# Patient Record
Sex: MALE | Race: ASIAN | NOT HISPANIC OR LATINO | Employment: UNEMPLOYED | URBAN - METROPOLITAN AREA
[De-identification: names, ages, dates, MRNs, and addresses within clinical notes are randomized per-mention and may not be internally consistent; named-entity substitution may affect disease eponyms.]

---

## 2022-08-14 ENCOUNTER — APPOINTMENT (OUTPATIENT)
Dept: RADIOLOGY | Facility: HOSPITAL | Age: 37
End: 2022-08-14
Attending: STUDENT IN AN ORGANIZED HEALTH CARE EDUCATION/TRAINING PROGRAM
Payer: COMMERCIAL

## 2022-08-14 ENCOUNTER — HOSPITAL ENCOUNTER (EMERGENCY)
Facility: HOSPITAL | Age: 37
Discharge: HOME | End: 2022-08-14
Attending: EMERGENCY MEDICINE | Admitting: EMERGENCY MEDICINE
Payer: COMMERCIAL

## 2022-08-14 VITALS
HEART RATE: 94 BPM | WEIGHT: 130 LBS | RESPIRATION RATE: 31 BRPM | OXYGEN SATURATION: 94 % | TEMPERATURE: 98.2 F | DIASTOLIC BLOOD PRESSURE: 88 MMHG | SYSTOLIC BLOOD PRESSURE: 138 MMHG | HEIGHT: 63 IN | BODY MASS INDEX: 23.04 KG/M2

## 2022-08-14 DIAGNOSIS — J45.909 PERSISTENT ASTHMA WITHOUT COMPLICATION, UNSPECIFIED ASTHMA SEVERITY: ICD-10-CM

## 2022-08-14 LAB
ANION GAP SERPL CALCULATED.3IONS-SCNC: 8 MMOL/L (ref 5–18)
ATRIAL RATE - MUSE: 71 BPM
BASOPHILS # BLD AUTO: 0.1 10E3/UL (ref 0–0.2)
BASOPHILS NFR BLD AUTO: 1 %
BUN SERPL-MCNC: 22 MG/DL (ref 8–22)
CALCIUM SERPL-MCNC: 9.6 MG/DL (ref 8.5–10.5)
CHLORIDE BLD-SCNC: 102 MMOL/L (ref 98–107)
CO2 SERPL-SCNC: 30 MMOL/L (ref 22–31)
CREAT SERPL-MCNC: 1.14 MG/DL (ref 0.7–1.3)
DIASTOLIC BLOOD PRESSURE - MUSE: NORMAL MMHG
EOSINOPHIL # BLD AUTO: 1.1 10E3/UL (ref 0–0.7)
EOSINOPHIL NFR BLD AUTO: 10 %
ERYTHROCYTE [DISTWIDTH] IN BLOOD BY AUTOMATED COUNT: 13.1 % (ref 10–15)
FLUAV RNA SPEC QL NAA+PROBE: NEGATIVE
FLUBV RNA RESP QL NAA+PROBE: NEGATIVE
GFR SERPL CREATININE-BSD FRML MDRD: 85 ML/MIN/1.73M2
GLUCOSE BLD-MCNC: 95 MG/DL (ref 70–125)
HCT VFR BLD AUTO: 52.4 % (ref 40–53)
HGB BLD-MCNC: 17.2 G/DL (ref 13.3–17.7)
IMM GRANULOCYTES # BLD: 0 10E3/UL
IMM GRANULOCYTES NFR BLD: 0 %
INTERPRETATION ECG - MUSE: NORMAL
LYMPHOCYTES # BLD AUTO: 3.4 10E3/UL (ref 0.8–5.3)
LYMPHOCYTES NFR BLD AUTO: 32 %
MCH RBC QN AUTO: 30.1 PG (ref 26.5–33)
MCHC RBC AUTO-ENTMCNC: 32.8 G/DL (ref 31.5–36.5)
MCV RBC AUTO: 92 FL (ref 78–100)
MONOCYTES # BLD AUTO: 0.6 10E3/UL (ref 0–1.3)
MONOCYTES NFR BLD AUTO: 6 %
NEUTROPHILS # BLD AUTO: 5.4 10E3/UL (ref 1.6–8.3)
NEUTROPHILS NFR BLD AUTO: 51 %
NRBC # BLD AUTO: 0 10E3/UL
NRBC BLD AUTO-RTO: 0 /100
P AXIS - MUSE: 76 DEGREES
PLATELET # BLD AUTO: 201 10E3/UL (ref 150–450)
POTASSIUM BLD-SCNC: 4.8 MMOL/L (ref 3.5–5)
PR INTERVAL - MUSE: 178 MS
QRS DURATION - MUSE: 92 MS
QT - MUSE: 374 MS
QTC - MUSE: 406 MS
R AXIS - MUSE: 95 DEGREES
RBC # BLD AUTO: 5.71 10E6/UL (ref 4.4–5.9)
RSV RNA SPEC NAA+PROBE: NEGATIVE
SARS-COV-2 RNA RESP QL NAA+PROBE: NEGATIVE
SODIUM SERPL-SCNC: 140 MMOL/L (ref 136–145)
SYSTOLIC BLOOD PRESSURE - MUSE: NORMAL MMHG
T AXIS - MUSE: 76 DEGREES
TROPONIN I SERPL-MCNC: <0.01 NG/ML (ref 0–0.29)
VENTRICULAR RATE- MUSE: 71 BPM
WBC # BLD AUTO: 10.5 10E3/UL (ref 4–11)

## 2022-08-14 PROCEDURE — C9803 HOPD COVID-19 SPEC COLLECT: HCPCS

## 2022-08-14 PROCEDURE — 93005 ELECTROCARDIOGRAM TRACING: CPT | Performed by: EMERGENCY MEDICINE

## 2022-08-14 PROCEDURE — 36415 COLL VENOUS BLD VENIPUNCTURE: CPT | Performed by: STUDENT IN AN ORGANIZED HEALTH CARE EDUCATION/TRAINING PROGRAM

## 2022-08-14 PROCEDURE — 80048 BASIC METABOLIC PNL TOTAL CA: CPT | Performed by: EMERGENCY MEDICINE

## 2022-08-14 PROCEDURE — 99285 EMERGENCY DEPT VISIT HI MDM: CPT | Mod: 25

## 2022-08-14 PROCEDURE — 250N000009 HC RX 250: Performed by: EMERGENCY MEDICINE

## 2022-08-14 PROCEDURE — 87637 SARSCOV2&INF A&B&RSV AMP PRB: CPT | Performed by: EMERGENCY MEDICINE

## 2022-08-14 PROCEDURE — 85025 COMPLETE CBC W/AUTO DIFF WBC: CPT | Performed by: EMERGENCY MEDICINE

## 2022-08-14 PROCEDURE — 71046 X-RAY EXAM CHEST 2 VIEWS: CPT

## 2022-08-14 PROCEDURE — 250N000012 HC RX MED GY IP 250 OP 636 PS 637: Performed by: EMERGENCY MEDICINE

## 2022-08-14 PROCEDURE — 94640 AIRWAY INHALATION TREATMENT: CPT | Mod: 76

## 2022-08-14 PROCEDURE — 84484 ASSAY OF TROPONIN QUANT: CPT | Performed by: STUDENT IN AN ORGANIZED HEALTH CARE EDUCATION/TRAINING PROGRAM

## 2022-08-14 RX ORDER — PREDNISONE 20 MG/1
TABLET ORAL
Qty: 8 TABLET | Refills: 0 | Status: SHIPPED | OUTPATIENT
Start: 2022-08-14 | End: 2023-05-07

## 2022-08-14 RX ORDER — IPRATROPIUM BROMIDE AND ALBUTEROL SULFATE 2.5; .5 MG/3ML; MG/3ML
3 SOLUTION RESPIRATORY (INHALATION)
Status: COMPLETED | OUTPATIENT
Start: 2022-08-14 | End: 2022-08-14

## 2022-08-14 RX ORDER — IPRATROPIUM BROMIDE AND ALBUTEROL SULFATE 2.5; .5 MG/3ML; MG/3ML
3 SOLUTION RESPIRATORY (INHALATION) ONCE
Status: COMPLETED | OUTPATIENT
Start: 2022-08-14 | End: 2022-08-14

## 2022-08-14 RX ORDER — PREDNISONE 20 MG/1
40 TABLET ORAL ONCE
Status: COMPLETED | OUTPATIENT
Start: 2022-08-14 | End: 2022-08-14

## 2022-08-14 RX ADMIN — IPRATROPIUM BROMIDE AND ALBUTEROL SULFATE 3 ML: .5; 3 SOLUTION RESPIRATORY (INHALATION) at 20:05

## 2022-08-14 RX ADMIN — PREDNISONE 40 MG: 20 TABLET ORAL at 19:24

## 2022-08-14 RX ADMIN — IPRATROPIUM BROMIDE AND ALBUTEROL SULFATE 3 ML: .5; 3 SOLUTION RESPIRATORY (INHALATION) at 20:52

## 2022-08-14 ASSESSMENT — ACTIVITIES OF DAILY LIVING (ADL)
ADLS_ACUITY_SCORE: 33
ADLS_ACUITY_SCORE: 35

## 2022-08-14 NOTE — ED TRIAGE NOTES
Pt presents with shortness of breath that began a year ago. Denies fever, occasional cough. Was told to come in today by his father.  Pt has hx of asthma. Used language line to speak with father. Father reports that pt has been wheezing and short of breath for a while now.     Triage Assessment     Row Name 08/14/22 [id number]       Triage Assessment (Adult)    Airway WDL WDL       Respiratory WDL    Respiratory WDL rhythm/pattern    Rhythm/Pattern, Respiratory shortness of breath;dyspnea on exertion       Skin Circulation/Temperature WDL    Skin Circulation/Temperature WDL WDL       Cardiac WDL    Cardiac WDL WDL       Peripheral/Neurovascular WDL    Peripheral Neurovascular WDL WDL       Cognitive/Neuro/Behavioral WDL    Cognitive/Neuro/Behavioral WDL WDL

## 2022-08-15 NOTE — ED NOTES
Pt to ED with complaints of SOB that has been occurring for about 1 year with no improvement. Wheezes on inspiration and expiration on assessment. Language line used.

## 2022-08-15 NOTE — ED PROVIDER NOTES
Emergency Department Encounter     Evaluation Date & Time:   2022  6:50 PM    CHIEF COMPLAINT:  Shortness of Breath      Triage Note:  Pt presents with shortness of breath that began a year ago. Denies fever, occasional cough. Was told to come in today by his father.  Pt has hx of asthma. Used language line to speak with father. Father reports that pt has been wheezing and short of breath for a while now.     Triage Assessment     Row Name [id number]       Triage Assessment (Adult)    Airway WDL WDL       Respiratory WDL    Respiratory WDL rhythm/pattern    Rhythm/Pattern, Respiratory shortness of breath;dyspnea on exertion       Skin Circulation/Temperature WDL    Skin Circulation/Temperature WDL WDL       Cardiac WDL    Cardiac WDL WDL       Peripheral/Neurovascular WDL    Peripheral Neurovascular WDL WDL       Cognitive/Neuro/Behavioral WDL    Cognitive/Neuro/Behavioral WDL WDL                    Impression and Plan       FINAL IMPRESSION:    ICD-10-CM    1. Persistent asthma without complication, unspecified asthma severity  J45.909          ED COURSE & MEDICAL DECISION MAKIN year old male, history of asthma, who presents for evaluation of shortness of breath that has been persistent since his asthma diagnosis 1-2 years ago. He denies any acute worsening of his symptoms, but his parents insisted ED evaluation given that he sometimes seems to have trouble breathing and makes wheezing sounds in his sleep. He has used his MDI with some improvement.     He also reports some substernal chest pain that has been intermittent x 1-2 years. The pain is worse with cough. He does endorse a chronic, non-productive cough with no associated URI symptoms or fevers.     On exam, he has symmetrical, coarse breath sounds with scattered expiratory wheezes.    Patient was given a DuoNeb with improvement, however he continues to sound coarse with some expiratory wheezes.  He was given 40mg po prednisone and a second  DuoNeb with significant improvement in breath sounds and resolution of wheezing.    Given chest pain, EKG performed and demonstrated NSR with J-point elevation in multiple leads (similar to previous EKG) and most consistent with early repolarization with no ST-T wave changes suggestive of ACS.  Troponin WNL (<0.01); a single, normal troponin is reassuring that symptoms are not secondary to ACS given that they have been intermittent for 1-2 years and I do not think serial troponin testing is indicated.    CXR performed and was unremarkable.    Influenza, COVID and RSV tests were negative.    Labs otherwise remarkable for no leukocytosis, anemia, electrolyte derangements or renal impairment.    Patient discharged to home with follow-up with his primary care provider this week.  He was given a prescription for a prednisone burst.  Return precautions provided with assistance from the professional mPowa .  Patient stable throughout ED course.      At the conclusion of the encounter I discussed the results of all the tests and the disposition. The questions were answered. The patient acknowledged understanding and was agreeable with the care plan.      MEDICATIONS GIVEN IN THE EMERGENCY DEPARTMENT:  Medications   ipratropium - albuterol 0.5 mg/2.5 mg/3 mL (DUONEB) neb solution 3 mL (3 mLs Nebulization Given 8/14/22 2005)   predniSONE (DELTASONE) tablet 40 mg (40 mg Oral Given 8/14/22 1924)   ipratropium - albuterol 0.5 mg/2.5 mg/3 mL (DUONEB) neb solution 3 mL (3 mLs Nebulization Given 8/14/22 2052)       NEW PRESCRIPTIONS STARTED AT TODAY'S ED VISIT:  Discharge Medication List as of 8/14/2022  9:19 PM      START taking these medications    Details   predniSONE (DELTASONE) 20 MG tablet Take two tablets (= 40mg) each day for 4 (four) days, Disp-8 tablet, R-0, Local Print             HPI     HPI     Secondary to language barrier, history was obtained with assistance from the professional mPowa  via the  language line on V[employer].    Aylin Rosado is a 36 year old male, history of asthma, who presents to this ED by walk-in for evaluation of shortness of breath.    Patient reports that he was diagnosed with asthma 1-2 years ago and has had shortness of breath since. He denies any acute worsening of his symptoms, but reports that his parents were worried because when he sleeps he sometimes seems to have trouble breathing and makes wheezing sounds so they insisted he present to the ER for evaluation. He has used his MDI with some improvement.     He reports some substernal chest pain that has been intermittent since he was diagnosed with asthma. The pain does not radiate into his back, arms, neck or jaw. He is unable to describe the pain, but reports that it worsens with cough. The pain is not exertional, positional or pleuritic in nature.     He reports somewhat chronic cough with no associated URI symptoms or fevers.     He has otherwise been in his usual state of health and denies abdominal pain, N/V/D, or other concerns.    He is a daily smoker.    REVIEW OF SYSTEMS:  All other systems reviewed and are negative.      Medical History     No past medical history on file.    No past surgical history on file.    No family history on file.         predniSONE (DELTASONE) 20 MG tablet  acetaminophen (TYLENOL) 325 MG tablet  albuterol (PROAIR HFA;PROVENTIL HFA;VENTOLIN HFA) 90 mcg/actuation inhaler  cyclobenzaprine (FLEXERIL) 10 MG tablet  inhalat.spacing dev,large mask Spcr  naproxen (NAPROSYN) 500 MG tablet        Physical Exam     First Vitals:  Patient Vitals for the past 24 hrs:   BP Temp Temp src Pulse Resp SpO2 Height Weight   08/14/22 2115 138/88 -- -- 94 (!) 31 94 % -- --   08/14/22 2100 (!) 143/99 -- -- 80 25 99 % -- --   08/14/22 2045 (!) 146/104 -- -- 72 19 95 % -- --   08/14/22 2030 (!) 161/112 -- -- 78 16 95 % -- --   08/14/22 2015 (!) 148/104 -- -- 72 24 95 % -- --   08/14/22 2000 (!) 157/99 -- -- 79 26 100 % --  "--   08/14/22 1930 (!) 152/98 -- -- 73 18 100 % -- --   08/14/22 1915 (!) 152/104 -- -- 69 16 98 % -- --   08/14/22 1756 (!) 167/113 98.2  F (36.8  C) Temporal 81 22 98 % 1.6 m (5' 3\") 59 kg (130 lb)       PHYSICAL EXAM:   Physical Exam    GENERAL: Awake, alert.  In no acute distress.   HEENT: Normocephalic, atraumatic. Pupils equal, round and reactive. Conjunctiva normal.   NECK: No stridor.  PULMONARY: Symmetrical breath sounds without distress.  Breath sounds are somewhat coarse diffusely with scattered expiratory wheezes; no rales.   CARDIO: Regular rate and rhythm.  No significant murmur, rub or gallop.  Radial pulses strong and symmetrical.  ABDOMINAL: Abdomen soft, non-distended and non-tender to palpation.    EXTREMITIES: No lower extremity swelling or edema.      NEURO: Alert and oriented to person, place and time.  Cranial nerves grossly intact.  No focal motor deficit.  PSYCH: Normal mood and affect.  SKIN: No rashes.     Results     LAB:  All pertinent labs reviewed and interpreted  Labs Ordered and Resulted from Time of ED Arrival to Time of ED Departure   CBC WITH PLATELETS AND DIFFERENTIAL - Abnormal       Result Value    WBC Count 10.5      RBC Count 5.71      Hemoglobin 17.2      Hematocrit 52.4      MCV 92      MCH 30.1      MCHC 32.8      RDW 13.1      Platelet Count 201      % Neutrophils 51      % Lymphocytes 32      % Monocytes 6      % Eosinophils 10      % Basophils 1      % Immature Granulocytes 0      NRBCs per 100 WBC 0      Absolute Neutrophils 5.4      Absolute Lymphocytes 3.4      Absolute Monocytes 0.6      Absolute Eosinophils 1.1 (*)     Absolute Basophils 0.1      Absolute Immature Granulocytes 0.0      Absolute NRBCs 0.0     INFLUENZA A/B & SARS-COV2 PCR MULTIPLEX - Normal    Influenza A PCR Negative      Influenza B PCR Negative      RSV PCR Negative      SARS CoV2 PCR Negative     BASIC METABOLIC PANEL - Normal    Sodium 140      Potassium 4.8      Chloride 102      Carbon " Dioxide (CO2) 30      Anion Gap 8      Urea Nitrogen 22      Creatinine 1.14      Calcium 9.6      Glucose 95      GFR Estimate 85     TROPONIN I - Normal    Troponin I <0.01         RADIOLOGY:  XR Chest 2 Views   Final Result   IMPRESSION: Stable chest with no acute cardiopulmonary findings. There is a mild thoracic curvature convex to the right. Pectus excavatum deformity.          EC2022, 18:18; NSR with rate of 71 bpm; normal intervals; normal conduction; J-point elevation in multiple leads most consistent with early repolarization with no ST-T wave changes consistent with ACS; compared to previous EKG dated 3/2/2020, there are no significant changes    EKG independently reviewed and interpreted by MD Jena Daniel MD  Emergency Medicine  [facility] EMERGENCY DEPARTMENT           Jena Tracy MD  08/15/22 [id number]

## 2022-08-15 NOTE — DISCHARGE INSTRUCTIONS
Please follow-up with your Primary Care Provider within 3-5 days for follow-up and a recheck; call to arrange appointment.    Return to the ER for worsening symptoms, worsening shortness of breath, chest pain, if you pass out or feel that you might, persistent vomiting, fever or other concerns.

## 2023-05-07 ENCOUNTER — HOSPITAL ENCOUNTER (INPATIENT)
Facility: HOSPITAL | Age: 38
LOS: 2 days | Discharge: HOME | End: 2023-05-11
Attending: EMERGENCY MEDICINE | Admitting: INTERNAL MEDICINE
Payer: COMMERCIAL

## 2023-05-07 ENCOUNTER — APPOINTMENT (OUTPATIENT)
Dept: RADIOLOGY | Facility: HOSPITAL | Age: 38
End: 2023-05-07
Attending: EMERGENCY MEDICINE
Payer: COMMERCIAL

## 2023-05-07 DIAGNOSIS — J45.901 ASTHMA WITH ACUTE EXACERBATION, UNSPECIFIED ASTHMA SEVERITY, UNSPECIFIED WHETHER PERSISTENT: ICD-10-CM

## 2023-05-07 LAB
ANION GAP SERPL CALCULATED.3IONS-SCNC: 15 MMOL/L (ref 7–15)
BUN SERPL-MCNC: 23.2 MG/DL (ref 6–20)
CALCIUM SERPL-MCNC: 9.7 MG/DL (ref 8.6–10)
CHLORIDE SERPL-SCNC: 98 MMOL/L (ref 98–107)
CREAT SERPL-MCNC: 0.84 MG/DL (ref 0.67–1.17)
DEPRECATED HCO3 PLAS-SCNC: 22 MMOL/L (ref 22–29)
ERYTHROCYTE [DISTWIDTH] IN BLOOD BY AUTOMATED COUNT: 13.6 % (ref 10–15)
GFR SERPL CREATININE-BSD FRML MDRD: >90 ML/MIN/1.73M2
GLUCOSE SERPL-MCNC: 104 MG/DL (ref 70–99)
HCT VFR BLD AUTO: 53.3 % (ref 40–53)
HGB BLD-MCNC: 17.6 G/DL (ref 13.3–17.7)
HOLD SPECIMEN: NORMAL
MCH RBC QN AUTO: 29.8 PG (ref 26.5–33)
MCHC RBC AUTO-ENTMCNC: 33 G/DL (ref 31.5–36.5)
MCV RBC AUTO: 90 FL (ref 78–100)
PLATELET # BLD AUTO: 194 10E3/UL (ref 150–450)
POTASSIUM SERPL-SCNC: 4.4 MMOL/L (ref 3.4–5.3)
RBC # BLD AUTO: 5.91 10E6/UL (ref 4.4–5.9)
SODIUM SERPL-SCNC: 135 MMOL/L (ref 136–145)
TROPONIN T SERPL HS-MCNC: <6 NG/L
WBC # BLD AUTO: 13.1 10E3/UL (ref 4–11)

## 2023-05-07 PROCEDURE — 85027 COMPLETE CBC AUTOMATED: CPT | Performed by: EMERGENCY MEDICINE

## 2023-05-07 PROCEDURE — 250N000009 HC RX 250: Performed by: EMERGENCY MEDICINE

## 2023-05-07 PROCEDURE — 99285 EMERGENCY DEPT VISIT HI MDM: CPT | Mod: 25

## 2023-05-07 PROCEDURE — 80048 BASIC METABOLIC PNL TOTAL CA: CPT | Performed by: EMERGENCY MEDICINE

## 2023-05-07 PROCEDURE — 36415 COLL VENOUS BLD VENIPUNCTURE: CPT | Performed by: STUDENT IN AN ORGANIZED HEALTH CARE EDUCATION/TRAINING PROGRAM

## 2023-05-07 PROCEDURE — 99223 1ST HOSP IP/OBS HIGH 75: CPT | Performed by: INTERNAL MEDICINE

## 2023-05-07 PROCEDURE — 250N000011 HC RX IP 250 OP 636: Performed by: EMERGENCY MEDICINE

## 2023-05-07 PROCEDURE — 71045 X-RAY EXAM CHEST 1 VIEW: CPT

## 2023-05-07 PROCEDURE — 94640 AIRWAY INHALATION TREATMENT: CPT

## 2023-05-07 PROCEDURE — 87637 SARSCOV2&INF A&B&RSV AMP PRB: CPT | Performed by: EMERGENCY MEDICINE

## 2023-05-07 PROCEDURE — G0378 HOSPITAL OBSERVATION PER HR: HCPCS

## 2023-05-07 PROCEDURE — 84484 ASSAY OF TROPONIN QUANT: CPT | Performed by: EMERGENCY MEDICINE

## 2023-05-07 PROCEDURE — C9803 HOPD COVID-19 SPEC COLLECT: HCPCS

## 2023-05-07 PROCEDURE — 93005 ELECTROCARDIOGRAM TRACING: CPT | Performed by: EMERGENCY MEDICINE

## 2023-05-07 PROCEDURE — 96365 THER/PROPH/DIAG IV INF INIT: CPT

## 2023-05-07 PROCEDURE — 96375 TX/PRO/DX INJ NEW DRUG ADDON: CPT

## 2023-05-07 RX ORDER — MAGNESIUM SULFATE HEPTAHYDRATE 40 MG/ML
2 INJECTION, SOLUTION INTRAVENOUS ONCE
Status: COMPLETED | OUTPATIENT
Start: 2023-05-07 | End: 2023-05-07

## 2023-05-07 RX ORDER — ALBUTEROL SULFATE 5 MG/ML
2.5 SOLUTION RESPIRATORY (INHALATION) EVERY 6 HOURS PRN
Status: DISCONTINUED | OUTPATIENT
Start: 2023-05-07 | End: 2023-05-07

## 2023-05-07 RX ORDER — IPRATROPIUM BROMIDE AND ALBUTEROL SULFATE 2.5; .5 MG/3ML; MG/3ML
3 SOLUTION RESPIRATORY (INHALATION) ONCE
Status: COMPLETED | OUTPATIENT
Start: 2023-05-07 | End: 2023-05-07

## 2023-05-07 RX ORDER — METHYLPREDNISOLONE SODIUM SUCCINATE 125 MG/2ML
125 INJECTION, POWDER, LYOPHILIZED, FOR SOLUTION INTRAMUSCULAR; INTRAVENOUS ONCE
Status: COMPLETED | OUTPATIENT
Start: 2023-05-07 | End: 2023-05-07

## 2023-05-07 RX ORDER — ONDANSETRON 2 MG/ML
4 INJECTION INTRAMUSCULAR; INTRAVENOUS ONCE
Status: COMPLETED | OUTPATIENT
Start: 2023-05-07 | End: 2023-05-07

## 2023-05-07 RX ORDER — ALBUTEROL SULFATE 5 MG/ML
2.5 SOLUTION RESPIRATORY (INHALATION) ONCE
Status: COMPLETED | OUTPATIENT
Start: 2023-05-07 | End: 2023-05-07

## 2023-05-07 RX ADMIN — IPRATROPIUM BROMIDE AND ALBUTEROL SULFATE 3 ML: .5; 3 SOLUTION RESPIRATORY (INHALATION) at 21:31

## 2023-05-07 RX ADMIN — ALBUTEROL SULFATE 2.5 MG: 2.5 SOLUTION RESPIRATORY (INHALATION) at 23:13

## 2023-05-07 RX ADMIN — IPRATROPIUM BROMIDE AND ALBUTEROL SULFATE 3 ML: .5; 3 SOLUTION RESPIRATORY (INHALATION) at 21:43

## 2023-05-07 RX ADMIN — METHYLPREDNISOLONE SODIUM SUCCINATE 125 MG: 125 INJECTION, POWDER, FOR SOLUTION INTRAMUSCULAR; INTRAVENOUS at 21:42

## 2023-05-07 RX ADMIN — MAGNESIUM SULFATE HEPTAHYDRATE 2 G: 40 INJECTION, SOLUTION INTRAVENOUS at 22:38

## 2023-05-07 RX ADMIN — ONDANSETRON 4 MG: 2 INJECTION INTRAMUSCULAR; INTRAVENOUS at 21:50

## 2023-05-07 RX ADMIN — ALBUTEROL SULFATE 2.5 MG: 2.5 SOLUTION RESPIRATORY (INHALATION) at 21:52

## 2023-05-07 ASSESSMENT — ACTIVITIES OF DAILY LIVING (ADL): ADLS_ACUITY_SCORE: 35

## 2023-05-08 LAB
ATRIAL RATE - MUSE: 108 BPM
BASE EXCESS BLDA CALC-SCNC: -1.1 MMOL/L
COHGB MFR BLD: 97.3 % (ref 96–97)
DIASTOLIC BLOOD PRESSURE - MUSE: 103 MMHG
ERYTHROCYTE [DISTWIDTH] IN BLOOD BY AUTOMATED COUNT: 13.6 % (ref 10–15)
FLUAV RNA SPEC QL NAA+PROBE: NEGATIVE
FLUBV RNA RESP QL NAA+PROBE: NEGATIVE
HCO3 BLD-SCNC: 25 MMOL/L (ref 23–29)
HCT VFR BLD AUTO: 51.9 % (ref 40–53)
HGB BLD-MCNC: 16.8 G/DL (ref 13.3–17.7)
HOLD SPECIMEN: NORMAL
INTERPRETATION ECG - MUSE: NORMAL
MCH RBC QN AUTO: 29.2 PG (ref 26.5–33)
MCHC RBC AUTO-ENTMCNC: 32.4 G/DL (ref 31.5–36.5)
MCV RBC AUTO: 90 FL (ref 78–100)
OXYHGB MFR BLD: 97.1 % (ref 96–97)
P AXIS - MUSE: 85 DEGREES
PCO2 BLD: 46 MM HG (ref 35–45)
PH BLD: 7.34 [PH] (ref 7.37–7.44)
PLATELET # BLD AUTO: 218 10E3/UL (ref 150–450)
PO2 BLD: 113 MM HG (ref 80–90)
PR INTERVAL - MUSE: 174 MS
QRS DURATION - MUSE: 94 MS
QT - MUSE: 330 MS
QTC - MUSE: 442 MS
R AXIS - MUSE: 95 DEGREES
RBC # BLD AUTO: 5.75 10E6/UL (ref 4.4–5.9)
RSV RNA SPEC NAA+PROBE: NEGATIVE
SARS-COV-2 RNA RESP QL NAA+PROBE: NEGATIVE
SYSTOLIC BLOOD PRESSURE - MUSE: 161 MMHG
T AXIS - MUSE: 74 DEGREES
TEMPERATURE: 37 DEGREES C
VENTRICULAR RATE- MUSE: 108 BPM
WBC # BLD AUTO: 9.4 10E3/UL (ref 4–11)

## 2023-05-08 PROCEDURE — 94640 AIRWAY INHALATION TREATMENT: CPT

## 2023-05-08 PROCEDURE — 36600 WITHDRAWAL OF ARTERIAL BLOOD: CPT

## 2023-05-08 PROCEDURE — 250N000009 HC RX 250: Performed by: INTERNAL MEDICINE

## 2023-05-08 PROCEDURE — 94640 AIRWAY INHALATION TREATMENT: CPT | Mod: 76

## 2023-05-08 PROCEDURE — G0378 HOSPITAL OBSERVATION PER HR: HCPCS

## 2023-05-08 PROCEDURE — 250N000013 HC RX MED GY IP 250 OP 250 PS 637: Performed by: STUDENT IN AN ORGANIZED HEALTH CARE EDUCATION/TRAINING PROGRAM

## 2023-05-08 PROCEDURE — 82805 BLOOD GASES W/O2 SATURATION: CPT | Performed by: INTERNAL MEDICINE

## 2023-05-08 PROCEDURE — 36415 COLL VENOUS BLD VENIPUNCTURE: CPT | Performed by: INTERNAL MEDICINE

## 2023-05-08 PROCEDURE — 250N000012 HC RX MED GY IP 250 OP 636 PS 637: Performed by: INTERNAL MEDICINE

## 2023-05-08 PROCEDURE — 250N000011 HC RX IP 250 OP 636: Performed by: INTERNAL MEDICINE

## 2023-05-08 PROCEDURE — 99232 SBSQ HOSP IP/OBS MODERATE 35: CPT | Performed by: STUDENT IN AN ORGANIZED HEALTH CARE EDUCATION/TRAINING PROGRAM

## 2023-05-08 PROCEDURE — 96366 THER/PROPH/DIAG IV INF ADDON: CPT

## 2023-05-08 PROCEDURE — 85027 COMPLETE CBC AUTOMATED: CPT | Performed by: INTERNAL MEDICINE

## 2023-05-08 PROCEDURE — 80061 LIPID PANEL: CPT | Performed by: STUDENT IN AN ORGANIZED HEALTH CARE EDUCATION/TRAINING PROGRAM

## 2023-05-08 PROCEDURE — 999N000157 HC STATISTIC RCP TIME EA 10 MIN

## 2023-05-08 RX ORDER — ACETAMINOPHEN 650 MG/1
650 SUPPOSITORY RECTAL EVERY 4 HOURS PRN
Status: DISCONTINUED | OUTPATIENT
Start: 2023-05-08 | End: 2023-05-11 | Stop reason: HOSPADM

## 2023-05-08 RX ORDER — PREDNISONE 20 MG/1
40 TABLET ORAL DAILY
Qty: 10 TABLET | Refills: 0 | Status: SHIPPED | OUTPATIENT
Start: 2023-05-09 | End: 2023-05-11

## 2023-05-08 RX ORDER — LEVALBUTEROL INHALATION SOLUTION 1.25 MG/3ML
1.25 SOLUTION RESPIRATORY (INHALATION)
Status: DISCONTINUED | OUTPATIENT
Start: 2023-05-08 | End: 2023-05-08

## 2023-05-08 RX ORDER — IPRATROPIUM BROMIDE AND ALBUTEROL SULFATE 2.5; .5 MG/3ML; MG/3ML
3 SOLUTION RESPIRATORY (INHALATION)
Status: DISCONTINUED | OUTPATIENT
Start: 2023-05-08 | End: 2023-05-08

## 2023-05-08 RX ORDER — LEVALBUTEROL INHALATION SOLUTION 1.25 MG/3ML
1.25 SOLUTION RESPIRATORY (INHALATION)
Status: DISCONTINUED | OUTPATIENT
Start: 2023-05-08 | End: 2023-05-09

## 2023-05-08 RX ORDER — MAGNESIUM SULFATE HEPTAHYDRATE 40 MG/ML
2 INJECTION, SOLUTION INTRAVENOUS ONCE
Status: COMPLETED | OUTPATIENT
Start: 2023-05-08 | End: 2023-05-08

## 2023-05-08 RX ORDER — ACETAMINOPHEN 325 MG/1
650 TABLET ORAL EVERY 4 HOURS PRN
Status: DISCONTINUED | OUTPATIENT
Start: 2023-05-08 | End: 2023-05-11 | Stop reason: HOSPADM

## 2023-05-08 RX ORDER — ALBUTEROL SULFATE 0.83 MG/ML
3 SOLUTION RESPIRATORY (INHALATION)
Status: DISCONTINUED | OUTPATIENT
Start: 2023-05-08 | End: 2023-05-11 | Stop reason: HOSPADM

## 2023-05-08 RX ORDER — PREDNISONE 20 MG/1
40 TABLET ORAL DAILY
Status: DISCONTINUED | OUTPATIENT
Start: 2023-05-08 | End: 2023-05-09

## 2023-05-08 RX ORDER — LIDOCAINE 40 MG/G
CREAM TOPICAL
Status: DISCONTINUED | OUTPATIENT
Start: 2023-05-08 | End: 2023-05-11 | Stop reason: HOSPADM

## 2023-05-08 RX ADMIN — PREDNISONE 40 MG: 20 TABLET ORAL at 08:24

## 2023-05-08 RX ADMIN — LEVALBUTEROL HYDROCHLORIDE 1.25 MG: 1.25 SOLUTION RESPIRATORY (INHALATION) at 13:21

## 2023-05-08 RX ADMIN — IPRATROPIUM BROMIDE 0.5 MG: 0.5 SOLUTION RESPIRATORY (INHALATION) at 13:21

## 2023-05-08 RX ADMIN — LEVALBUTEROL HYDROCHLORIDE 1.25 MG: 1.25 SOLUTION RESPIRATORY (INHALATION) at 07:56

## 2023-05-08 RX ADMIN — ALBUTEROL SULFATE 2.5 MG: 2.5 SOLUTION RESPIRATORY (INHALATION) at 01:22

## 2023-05-08 RX ADMIN — IPRATROPIUM BROMIDE 0.5 MG: 0.5 SOLUTION RESPIRATORY (INHALATION) at 07:56

## 2023-05-08 RX ADMIN — LEVALBUTEROL HYDROCHLORIDE 1.25 MG: 1.25 SOLUTION RESPIRATORY (INHALATION) at 16:12

## 2023-05-08 RX ADMIN — LEVALBUTEROL HYDROCHLORIDE 1.25 MG: 1.25 SOLUTION RESPIRATORY (INHALATION) at 03:37

## 2023-05-08 RX ADMIN — IPRATROPIUM BROMIDE 0.5 MG: 0.5 SOLUTION RESPIRATORY (INHALATION) at 16:12

## 2023-05-08 RX ADMIN — MAGNESIUM SULFATE HEPTAHYDRATE 2 G: 40 INJECTION, SOLUTION INTRAVENOUS at 01:31

## 2023-05-08 RX ADMIN — IPRATROPIUM BROMIDE 0.5 MG: 0.5 SOLUTION RESPIRATORY (INHALATION) at 03:36

## 2023-05-08 RX ADMIN — FLUTICASONE FUROATE 1 PUFF: 100 POWDER RESPIRATORY (INHALATION) at 16:48

## 2023-05-08 ASSESSMENT — ACTIVITIES OF DAILY LIVING (ADL)
ADLS_ACUITY_SCORE: 35
ADLS_ACUITY_SCORE: 31
ADLS_ACUITY_SCORE: 35
ADLS_ACUITY_SCORE: 31
ADLS_ACUITY_SCORE: 35

## 2023-05-08 NOTE — PHARMACY-ADMISSION MEDICATION HISTORY
Pharmacist Admission Medication History    Admission medication history is complete. The information provided in this note is only as accurate as the sources available at the time of the update.    Medication reconciliation/reorder completed by provider prior to medication history? No    Information Source(s): Patient and CareEverywhere/SureScripts via in-person    Changes made to PTA medication list:    Added: None    Deleted: acetaminophen, cyclobenzaprine, naproxen, prednisone    Changed: None     Allergies reviewed with patient and updates made in EHR: yes    Medication History Completed By: Celine De León [facility] 5/7/2023 11:03 PM    Prior to Admission medications    Medication Sig Last Dose Taking? Auth Provider Long Term End Date   albuterol (PROAIR HFA;PROVENTIL HFA;VENTOLIN HFA) 90 mcg/actuation inhaler [ALBUTEROL (PROAIR HFA;PROVENTIL HFA;VENTOLIN HFA) 90 MCG/ACTUATION INHALER] Inhale 1-2 puffs every 6 (six) hours as needed for wheezing or shortness of breath. 5/7/2023 at multiple uses Yes Juanpablo Dial MD Yes    inhalat.spacing dev,large mask Spcr [INHALAT.SPACING DEV,LARGE MASK SPCR] Use 1 each As Directed every 4 (four) hours as needed.   Juanpablo Dial MD

## 2023-05-08 NOTE — H&P
[facility]    History and Physical - Hospitalist Service       Date of Admission:  5/7/2023    Assessment & Plan      Aylin Rosado is a 37 year old male with medical history of asthma and cigarette smoking, admitted on 5/7/2023 with shortness of breath, wheezing and chest tightness which started after he ingested some energy drinks, and would not respond to his usual albuterol inhaler treatments.  ED work-up showed hyperinflation on chest x-ray and sinus tachycardia on EKG.  Troponin was normal.    #Status asthmaticus:  - Bronchodilator treatment.  - Systemic steroids.  - Magnesium sulfate.  - Oxygen supplementation to keep saturations over 92%.    #Tobacco use disorder:  - Smoking cessation counseled.  - Nicotine patch as needed for cravings.     Diet: Regular Diet Adult    DVT Prophylaxis: Ambulate every shift  Rebolledo Catheter: Not present  Lines: None     Cardiac Monitoring: None  Code Status:  Full code    Clinically Significant Risk Factors Present on Admission                               Disposition Plan Home     Expected Discharge Date: 05/08/2023                  Elizabeth Rai MD  Hospitalist Service  [facility]  Securely message with [employer] (more info)  Text page via [employer] Paging/Directory     ______________________________________________________________________    Chief Complaint   Shortness of breath for 1 day    History is obtained from the patient    History of Present Illness   Aylin Rosado is a 37 year old male who presented to the ED with severe shortness of breath at rest, wheezing and chest tightness.  Symptoms started after he ingested some energy drink.  Has never had an asthma attack this severe or requiring his coming to the ER.  No fever, vision changes, altered mentation or palpitations.      Past Medical History    Asthma not requiring hospitalizations or ER visits.    Past Surgical History   History reviewed. No pertinent surgical  history.    Prior to Admission Medications   Prior to Admission Medications   Prescriptions Last Dose Informant Patient Reported? Taking?   albuterol (PROAIR HFA;PROVENTIL HFA;VENTOLIN HFA) 90 mcg/actuation inhaler 5/7/2023 at multiple uses  No Yes   Sig: [ALBUTEROL (PROAIR HFA;PROVENTIL HFA;VENTOLIN HFA) 90 MCG/ACTUATION INHALER] Inhale 1-2 puffs every 6 (six) hours as needed for wheezing or shortness of breath.   inhalat.spacing dev,large mask Spcr   No No   Sig: [INHALAT.SPACING DEV,LARGE MASK SPCR] Use 1 each As Directed every 4 (four) hours as needed.      Facility-Administered Medications: None        Review of Systems    The 10 point Review of Systems is negative other than noted in the HPI      Physical Exam   Vital Signs: Temp: 98.9  F (37.2  C) Temp src: Oral BP: 128/73 Pulse: 107   Resp: 25 SpO2: 96 % O2 Device: Nasal cannula Oxygen Delivery: 3 LPM  Weight: 0 lbs 0 oz    General Appearance: Well nourished and well developed, in moderate respiratory distress  Respiratory: Bilateral expiratory wheezing with prolonged expiratory phase.   Cardiovascular:.  Regular rate and rhythm.  S1 and S2 normal.  No murmurs, gallops or rubs.  GI: Soft, nontender, not distended.  Bowel sounds present.  No organomegaly.  Skin: Good turgor.  Warm and dry.  No rash or wounds.  Other: Alert, oriented x3.  No focal motor or sensory signs.    Medical Decision Making     75 MINUTES SPENT BY ME on the date of service doing chart review, history, exam, documentation & further activities per the note.      Data     I have personally reviewed the following data over the past 24 hrs:    13.1 (H)  \   17.6   / 194     135 (L) 98 23.2 (H) /  104 (H)   4.4 22 0.84 \       Trop: <6 BNP: N/A       Imaging results reviewed over the past 24 hrs:   Recent Results (from the past 24 hour(s))   XR Chest Port 1 View    Narrative    EXAM: XR CHEST PORT 1 VIEW  LOCATION: [facility]  DATE/TIME: 5/7/2023 10:10 PM  CDT    INDICATION: SOB  COMPARISON: 08/14/2022      Impression    IMPRESSION: Heart size is normal. Lungs are hyperinflated with basilar scarring or atelectasis. No lobar consolidation, CHF, or effusions. No pneumothorax. No acute bony abnormality.

## 2023-05-08 NOTE — ED TRIAGE NOTES
Pt arrives to triage for an asthma exsatrasbation. Pt was 87% on room air in triage with visible retractions and increased work of breathing. Pt has audible wheezes. Pt brought back to room and oxygen applied via oxymask at 15L, oxygen saturation improved to 100%.      Triage Assessment     Row Name 05/07/23 [id number]       Triage Assessment (Adult)    Airway WDL WDL       Respiratory WDL    Respiratory WDL X;rhythm/pattern;expansion/retractions    Rhythm/Pattern, Respiratory tachypneic;shortness of breath    Expansion/Accessory Muscles/Retractions accessory muscle use;retractions marked       Skin Circulation/Temperature WDL    Skin Circulation/Temperature WDL WDL       Cardiac WDL    Cardiac WDL X;rhythm    Pulse Rate & Regularity tachycardic       Peripheral/Neurovascular WDL    Peripheral Neurovascular WDL WDL       Cognitive/Neuro/Behavioral WDL    Cognitive/Neuro/Behavioral WDL WDL

## 2023-05-08 NOTE — PROGRESS NOTES
Pt currently on 2-3L NC. Given Atrovent/Xopenex as scheduled. BS are expiratory wheeze/diminished.  bpm and SpO2 at 96%. Pt tolerated tx well. RT following.     Jessy Bledsoe, RT

## 2023-05-08 NOTE — ED NOTES
Provider paged due to pt becoming increasingly short of breath again. Pt using accessory muscles, grunting with audible wheezes. Pt is receiving PRN nebulizer treatment at this time. Oxygen saturation stable from 93%-98%. Pt is also diaphoretic.

## 2023-05-08 NOTE — ED NOTES
His oxygen saturation has dropped to 88% on room air even after the neb treatments. Started him back on 1Lpm oxygen by abbe and advised the provider.   Statement Selected significant other

## 2023-05-08 NOTE — ED PROVIDER NOTES
Emergency Department Encounter     Evaluation Date & Time:   2023  9:18 PM    CHIEF COMPLAINT:  Shortness of Breath      Triage Note:  Pt arrives to triage for an asthma exsatrasbation. Pt was 87% on room air in triage with visible retractions and increased work of breathing. Pt has audible wheezes. Pt brought back to room and oxygen applied via oxymask at 15L, oxygen saturation improved to 100%.      Triage Assessment     Row Name 238       Triage Assessment (Adult)    Airway WDL WDL       Respiratory WDL    Respiratory WDL X;rhythm/pattern;expansion/retractions    Rhythm/Pattern, Respiratory tachypneic;shortness of breath    Expansion/Accessory Muscles/Retractions accessory muscle use;retractions marked       Skin Circulation/Temperature WDL    Skin Circulation/Temperature WDL WDL       Cardiac WDL    Cardiac WDL X;rhythm    Pulse Rate & Regularity tachycardic       Peripheral/Neurovascular WDL    Peripheral Neurovascular WDL WDL       Cognitive/Neuro/Behavioral WDL    Cognitive/Neuro/Behavioral WDL WDL                    Impression and Plan       FINAL IMPRESSION:    ICD-10-CM    1. Asthma with acute exacerbation, unspecified asthma severity, unspecified whether persistent  J45.901             ED COURSE & MEDICAL DECISION MAKIN:38 PM I met with patient for initial interview and encounter. PPE worn includes exam gloves, surgical gown,, and surgical mask.   10:28 PM Rechecked on the patient. We discussed plans for admission.      37 year old male, history of asthma, who presents for evaluation of shortness of breath, gradually worsening since onset 2 days ago associated with chest tightness and occasional cough. Has used his MDI > 10 times today without relief of symptoms.      On presentation, patient hypoxic to 87% on RA, in moderate acute distress with increased work of breathing, tachypnea and prolonged expiratory phase. He has somewhat coarse breath sounds with diffuse inspiratory and  expiratory wheezes.     Patient placed on supplemental O2 and cardiac monitor, IV access established and blood sent for labs.    DuoNeb x 2 and an albuterol neb were ordered in succession.  Patient also given 125 Mg IV Solu-Medrol, which did however cause him to vomit (patient then given IV Zofran).    EKG performed and demonstrated sinus tachycardia with diffuse mild ST-T wave elevation, likely secondary to early repolarization pattern and similar to prior EKG.  Troponin negative (<6).    Portable CXR performed and demonstrated normal heart size with hyperinflated, clear lungs; no pneumothorax. Clinical picture consistent with asthma exacerbation; I do not suspect PE and risks of CTA chest felt to outweigh benefit.     Patient improved after nebs and steroids, however he still has diffuse inspiratory and expiratory wheezing with some ongoing shortness of breath.  Patient given 2g IV magnesium and a 4th neb.  O2 sats mid-upper 90%s on 3L via NC.     COVID, influenza and RSV tests negative.     Labs otherwise remarkable for no leukocytosis, anemia, significant electrolyte derangements or renal impairment.    Decision made to admit patient for further treatment and monitoring.  Patient admitted to Hospitalist Service.  Patient hemodynamically stable throughout ED course.    Medical Decision Making    History:    Supplemental history from: Documented in chart, if applicable    External Record(s) reviewed: Documented in chart, if applicable.    Work Up:    Chart documentation includes differential considered and any EKGs or imaging independently interpreted by provider, where specified.    10:24 PM    I independently reviewed the patient's CXR - there is hyperinflation with no focal opacities, pleural effusion or pneumothorax; please refer to radiologist's report for official read      In additional to work up documented, I considered the following work up: Documented in chart, if applicable. and Imaging CT, but  deferred due to symptoms consistent with asthma.    External consultation:    Discussion of management with another provider: Documented in chart, if applicable and Hospitalist    Complicating factors:    Care impacted by chronic illness: Chronic Lung Disease    Care affected by social determinants of health: Access to Medical Care - weekend evening - unable to obtain PCP appointment    Disposition considerations: Admit.      At the conclusion of the encounter I discussed the results of all the tests and the disposition. The questions were answered. The patient and family acknowledged understanding and were agreeable with the care plan.        MEDICATIONS GIVEN IN THE EMERGENCY DEPARTMENT:  Medications   lidocaine 1 % 0.1-1 mL (has no administration in time range)   lidocaine (LMX4) cream (has no administration in time range)   sodium chloride (PF) 0.9% PF flush 3 mL (has no administration in time range)   sodium chloride (PF) 0.9% PF flush 3 mL (3 mLs Intracatheter $Given 5/8/23 0829)   albuterol (PROVENTIL) neb solution 2.5 mg (2.5 mg Nebulization $Given 5/8/23 0122)   acetaminophen (TYLENOL) tablet 650 mg (has no administration in time range)   acetaminophen (TYLENOL) Suppository 650 mg (has no administration in time range)   predniSONE (DELTASONE) tablet 40 mg (40 mg Oral $Given 5/8/23 0824)   ipratropium (ATROVENT) 0.02 % neb solution 0.5 mg (0.5 mg Nebulization $Given 5/8/23 0756)     And   levalbuterol (XOPENEX) neb solution 1.25 mg (1.25 mg Nebulization $Given 5/8/23 0756)   ipratropium - albuterol 0.5 mg/2.5 mg/3 mL (DUONEB) neb solution 3 mL (3 mLs Nebulization $Given 5/7/23 2131)   ipratropium - albuterol 0.5 mg/2.5 mg/3 mL (DUONEB) neb solution 3 mL (3 mLs Nebulization $Given 5/7/23 2143)   methylPREDNISolone sodium succinate (solu-MEDROL) injection 125 mg (125 mg Intravenous $Given 5/7/23 2142)   ondansetron (ZOFRAN) injection 4 mg (4 mg Intravenous $Given 5/7/23 2150)   magnesium sulfate 2 g in 50 mL  sterile water intermittent infusion (0 g Intravenous Stopped 5/7/23 2341)   albuterol (PROVENTIL) neb solution 2.5 mg (2.5 mg Nebulization $Given 5/7/23 3488)   magnesium sulfate 2 g in 50 mL sterile water intermittent infusion (0 g Intravenous Stopped 5/8/23 0251)       NEW PRESCRIPTIONS STARTED AT TODAY'S ED VISIT:  New Prescriptions    No medications on file       HPI     HPI     Secondary to language barrier, history obtained with assistance from the professional [employer]  via the language line on [employer].    Aylin Rosado is a 37 year old male, history of asthma and a smoker, who presents to this ED via walk-in accompanied by family for evaluation of shortness of breath.     He reports onset of shortness of breath Friday (2 days ago), which has progressively worsened since onset. Symptoms feel consistent with his asthma, but despite using his albuterol MDI at least 10 times today, he has not improved. He reports associated intermittent chest tightness. He has occasional cough without other URI symptoms, fevers.     He is unsure when he was on steroids and has never been hospitalized for his asthma.      He has otherwise been in his usual state of health and denies other concerns.    REVIEW OF SYSTEMS:  All other systems reviewed and are negative.      Medical History     History reviewed. No pertinent past medical history.    History reviewed. No pertinent surgical history.    No family history on file.         albuterol (PROAIR HFA;PROVENTIL HFA;VENTOLIN HFA) 90 mcg/actuation inhaler  inhalat.spacing dev,large mask Spcr        Physical Exam     First Vitals:  Patient Vitals for the past 24 hrs:   BP Temp Temp src Pulse Resp SpO2   05/08/23 0756 -- -- -- -- -- 96 %   05/08/23 0755 (!) 154/77 98.6  F (37  C) Oral 116 27 95 %   05/08/23 0454 121/58 -- -- 102 21 96 %   05/08/23 0439 124/55 -- -- 103 22 96 %   05/08/23 0417 128/73 -- -- 107 25 96 %   05/08/23 0357 111/68 -- -- 112 26 96 %   05/08/23 0327 114/72  -- -- 100 22 98 %   05/08/23 0317 136/79 -- -- 104 19 98 %   05/08/23 0215 124/60 -- -- 100 19 95 %   05/08/23 0147 130/75 -- -- 104 15 97 %   05/08/23 0134 -- -- -- 105 16 97 %   05/08/23 0130 133/77 -- -- 108 19 98 %   05/08/23 0032 -- -- -- 101 17 94 %   05/08/23 0031 -- -- -- 101 17 94 %   05/08/23 0030 127/79 -- -- 102 18 95 %   05/08/23 0000 (!) 148/88 -- -- 100 21 95 %   05/07/23 2331 123/88 -- -- 101 18 96 %   05/07/23 2326 -- -- -- 108 21 98 %   05/07/23 2316 129/85 -- -- 101 14 99 %   05/07/23 2310 -- -- -- 105 22 96 %   05/07/23 2300 (!) 151/94 -- -- 100 18 97 %   05/07/23 2254 -- -- -- 103 17 97 %   05/07/23 2253 -- -- -- 105 22 97 %   05/07/23 2241 -- 98.9  F (37.2  C) Oral -- -- --   05/07/23 2216 (!) 168/104 -- -- 115 18 98 %   05/07/23 2200 (!) 161/103 -- -- 109 27 100 %   05/07/23 2131 -- -- -- 119 -- 97 %   05/07/23 2130 (!) 155/104 -- -- 115 -- 97 %   05/07/23 2123 -- -- -- 114 -- 100 %   05/07/23 2122 -- -- -- 110 -- 100 %   05/07/23 2121 (!) 175/113 -- -- -- -- --   05/07/23 2120 (!) 175/113 -- -- 120 28 97 %       PHYSICAL EXAM:   Physical Exam    GENERAL: Awake, alert.  In moderate acute distress.  HEENT: Normocephalic, atraumatic. Pupils equal, round and reactive. Conjunctiva normal.   NECK: No stridor.  PULMONARY: Patient with moderate acute respiratory distress with increased work of breathing, tachypnea and prolonged expiratory phase. Somewhat coarse breath sounds with diffuse inspiratory and expiratory wheezes. No rales.   CARDIO: Tachycardic rate with regular rhythm.  No significant murmur, rub or gallop.  Radial pulses strong and symmetrical.  ABDOMINAL: Abdomen soft, non-distended and non-tender to palpation.   EXTREMITIES: No lower extremity swelling or edema.      NEURO: Alert and oriented to person, place and time.  Cranial nerves grossly intact.  No focal motor deficit.  PSYCH: Normal mood and affect.    Results     LAB:  All pertinent labs reviewed and interpreted  Labs Ordered  and Resulted from Time of ED Arrival to Time of ED Departure   CBC WITH PLATELETS - Abnormal       Result Value    WBC Count 13.1 (*)     RBC Count 5.91 (*)     Hemoglobin 17.6      Hematocrit 53.3 (*)     MCV 90      MCH 29.8      MCHC 33.0      RDW 13.6      Platelet Count 194     BASIC METABOLIC PANEL - Abnormal    Sodium 135 (*)     Potassium 4.4      Chloride 98      Carbon Dioxide (CO2) 22      Anion Gap 15      Urea Nitrogen 23.2 (*)     Creatinine 0.84      Calcium 9.7      Glucose 104 (*)     GFR Estimate >90     BLOOD GAS ARTERIAL - Abnormal    pH Arterial 7.34 (*)     pCO2 Arterial 46 (*)     pO2 Arterial 113 (*)     Bicarbonate Arterial 25      O2 Sat, Arterial 97.3 (*)     Oxyhemoglobin 97.1 (*)     Base Excess/Deficit (+/-) -1.1      Sample Stabilized Temperature 37.0     TROPONIN T, HIGH SENSITIVITY - Normal    Troponin T, High Sensitivity <6     INFLUENZA A/B, RSV, & SARS-COV2 PCR - Normal    Influenza A PCR Negative      Influenza B PCR Negative      RSV PCR Negative      SARS CoV2 PCR Negative     CBC WITH PLATELETS - Normal    WBC Count 9.4      RBC Count 5.75      Hemoglobin 16.8      Hematocrit 51.9      MCV 90      MCH 29.2      MCHC 32.4      RDW 13.6      Platelet Count 218         RADIOLOGY:  XR Chest Port 1 View   Final Result   IMPRESSION: Heart size is normal. Lungs are hyperinflated with basilar scarring or atelectasis. No lobar consolidation, CHF, or effusions. No pneumothorax. No acute bony abnormality.          EC2023, 22:02; sinus tachycardia with rate of 108 bpm; normal intervals; normal conduction; diffuse mild ST-T wave elevation likely secondary to early repolarization pattern; compared to previous EKG dated 2022, the rate has increased by 37 bpm, otherwise no significant changes    EKG independently reviewed and interpreted by Jena Tracy MD        I, Jeanne Her, am serving as a scribe to document services personally performed by Jena Tracy MD based on my  observation and the provider's statements to me. I, Jena Tracy MD attest that Jeanne Her is acting in a scribe capacity, has observed my performance of the services and has documented them in accordance with my direction.    Jena Tracy MD  Emergency Medicine  [facility] EMERGENCY DEPARTMENT             Jena Tracy MD  05/08/23 0915       Jena Tracy MD  05/08/23 0917

## 2023-05-08 NOTE — ED NOTES
[facility] ED Handoff Report    ED Chief Complaint:     ED Diagnosis:  (J45.901) Asthma with acute exacerbation, unspecified asthma severity, unspecified whether persistent  Comment:   Plan: predniSONE (DELTASONE) 20 MG tablet, budesonide        (PULMICORT FLEXHALER) 90 MCG/ACT inhaler              PMH:  History reviewed. No pertinent past medical history.     Code Status:  No Order     Falls Risk: No Band: Not applicable    Current Living Situation/Residence: lives alone     Elimination Status: Continent: Yes     Activity Level: SBA    Patients Preferred Language:  English, can understand simple English.     Needed: Yes Malika for complex medical terminology    Vital Signs:  /63   Pulse 110   Temp 98.6  F (37  C) (Oral)   Resp 22   SpO2 93%      Cardiac Rhythm: sinus tachy    Pain Score: 0/10    Is the Patient Confused:  No    Last Food or Drink:  eat lunch today    Focused Assessment:  pt come in for evaluation of shortness of breath, asthma excerebration,  non 2 L nc with sats of 93%. Pt alert and communicates needs.    Tests Performed: Done: Labs and Imaging    Treatments Provided:  see MAR    Family Dynamics/Concerns: No    Family Updated On Visitor Policy: Yes    Plan of Care Communicated to Family: Yes    Who Was Updated about Plan of Care: father at bedside.    Belongings Checklist Done and Signed by Patient: Yes    Medications sent with patient: inhaler    Covid: asymptomatic , negative    Additional Information: none

## 2023-05-08 NOTE — PROGRESS NOTES
[facility]    Medicine Progress Note - Hospitalist Service    Date of Admission:  5/7/2023    Assessment & Plan   Aylin Rosado is a 37 year old male with medical history of asthma and cigarette smoking, admitted on 5/7/2023 with shortness of breath, wheezing and chest tightness which started after he ingested some energy drinks, and would not respond to his usual albuterol inhaler treatments.  ED work-up showed hyperinflation on chest x-ray and sinus tachycardia on EKG.  Troponin was normal.     Asthma exacerbation  Patient is currently on 2 L oxygen via nasal cannula.  Attempted to turn off oxygen and patient desaturated to high 80s.  -Continue prednisone 40 mg oral daily for 5 days  -Start Pulmicort  -Continue albuterol inhaler     Tobacco use disorder:  - Smoking cessation counseled.  - Nicotine patch as needed for cravings.       Diet: Regular Diet Adult  Diet    DVT Prophylaxis: Pneumatic Compression Devices  Rebolledo Catheter: Not present  Lines: None     Cardiac Monitoring: None  Code Status:   Full code    Clinically Significant Risk Factors Present on Admission                               Disposition Plan     Expected Discharge Date: 05/08/2023                  Dayton Farrar MD  Hospitalist Service  [facility]  Securely message with [employer] (more info)  Text page via [employer] Paging/Directory   ______________________________________________________________________    Interval History   Patient boarding in ER awaiting bed assignment.  No distress noted.  Currently on 2 L oxygen via nasal cannula.  Management plan discussed with the patient and his father was present at the bedside.    Physical Exam   Vital Signs: Temp: 98.6  F (37  C) Temp src: Oral BP: 107/63 Pulse: 110   Resp: 22 SpO2: 93 % O2 Device: Nasal cannula Oxygen Delivery: 2 LPM  Weight: 0 lbs 0 oz    General Appearance: No distress noted  Respiratory: Faint expiratory wheezes  scattered  Cardiovascular: S1 and S2 well heard, no murmur or gallop  GI: Soft abdomen, no tenderness, normoactive bowel sounds  Skin: Intact and warm    Medical Decision Making       45 MINUTES SPENT BY ME on the date of service doing chart review, history, exam, documentation & further activities per the note.      Data

## 2023-05-09 ENCOUNTER — APPOINTMENT (OUTPATIENT)
Dept: CT IMAGING | Facility: HOSPITAL | Age: 38
End: 2023-05-09
Attending: STUDENT IN AN ORGANIZED HEALTH CARE EDUCATION/TRAINING PROGRAM
Payer: COMMERCIAL

## 2023-05-09 LAB
ATRIAL RATE - MUSE: 108 BPM
BASE EXCESS BLDV CALC-SCNC: 5.6 MMOL/L
CHOLEST SERPL-MCNC: 253 MG/DL
D DIMER PPP FEU-MCNC: <0.27 UG/ML FEU (ref 0–0.5)
DIASTOLIC BLOOD PRESSURE - MUSE: NORMAL MMHG
HCO3 BLDV-SCNC: 30 MMOL/L (ref 24–30)
HDLC SERPL-MCNC: 83 MG/DL
HOLD SPECIMEN: NORMAL
INTERPRETATION ECG - MUSE: NORMAL
LDLC SERPL CALC-MCNC: 158 MG/DL
NONHDLC SERPL-MCNC: 170 MG/DL
OXYHGB MFR BLDV: 90.2 % (ref 70–75)
P AXIS - MUSE: 81 DEGREES
PCO2 BLDV: 46 MM HG (ref 35–50)
PH BLDV: 7.42 [PH] (ref 7.35–7.45)
PO2 BLDV: 60 MM HG (ref 25–47)
PR INTERVAL - MUSE: 160 MS
QRS DURATION - MUSE: 100 MS
QT - MUSE: 332 MS
QTC - MUSE: 444 MS
R AXIS - MUSE: 87 DEGREES
SAO2 % BLDV: 90.4 % (ref 70–75)
SYSTOLIC BLOOD PRESSURE - MUSE: NORMAL MMHG
T AXIS - MUSE: 62 DEGREES
TRIGL SERPL-MCNC: 61 MG/DL
TROPONIN T SERPL HS-MCNC: <6 NG/L
VENTRICULAR RATE- MUSE: 108 BPM

## 2023-05-09 PROCEDURE — 93005 ELECTROCARDIOGRAM TRACING: CPT | Performed by: STUDENT IN AN ORGANIZED HEALTH CARE EDUCATION/TRAINING PROGRAM

## 2023-05-09 PROCEDURE — 93010 ELECTROCARDIOGRAM REPORT: CPT | Performed by: INTERNAL MEDICINE

## 2023-05-09 PROCEDURE — 120N000001 HC R&B MED SURG/OB

## 2023-05-09 PROCEDURE — 250N000009 HC RX 250: Performed by: INTERNAL MEDICINE

## 2023-05-09 PROCEDURE — 999N000157 HC STATISTIC RCP TIME EA 10 MIN

## 2023-05-09 PROCEDURE — 82805 BLOOD GASES W/O2 SATURATION: CPT | Performed by: STUDENT IN AN ORGANIZED HEALTH CARE EDUCATION/TRAINING PROGRAM

## 2023-05-09 PROCEDURE — 36415 COLL VENOUS BLD VENIPUNCTURE: CPT | Performed by: STUDENT IN AN ORGANIZED HEALTH CARE EDUCATION/TRAINING PROGRAM

## 2023-05-09 PROCEDURE — 94640 AIRWAY INHALATION TREATMENT: CPT

## 2023-05-09 PROCEDURE — 93005 ELECTROCARDIOGRAM TRACING: CPT

## 2023-05-09 PROCEDURE — 94640 AIRWAY INHALATION TREATMENT: CPT | Mod: 76

## 2023-05-09 PROCEDURE — 250N000011 HC RX IP 250 OP 636: Performed by: STUDENT IN AN ORGANIZED HEALTH CARE EDUCATION/TRAINING PROGRAM

## 2023-05-09 PROCEDURE — G0378 HOSPITAL OBSERVATION PER HR: HCPCS

## 2023-05-09 PROCEDURE — 250N000012 HC RX MED GY IP 250 OP 636 PS 637: Performed by: STUDENT IN AN ORGANIZED HEALTH CARE EDUCATION/TRAINING PROGRAM

## 2023-05-09 PROCEDURE — 99232 SBSQ HOSP IP/OBS MODERATE 35: CPT | Performed by: STUDENT IN AN ORGANIZED HEALTH CARE EDUCATION/TRAINING PROGRAM

## 2023-05-09 PROCEDURE — 85379 FIBRIN DEGRADATION QUANT: CPT | Performed by: STUDENT IN AN ORGANIZED HEALTH CARE EDUCATION/TRAINING PROGRAM

## 2023-05-09 PROCEDURE — 71275 CT ANGIOGRAPHY CHEST: CPT

## 2023-05-09 PROCEDURE — 84484 ASSAY OF TROPONIN QUANT: CPT | Performed by: STUDENT IN AN ORGANIZED HEALTH CARE EDUCATION/TRAINING PROGRAM

## 2023-05-09 PROCEDURE — 250N000012 HC RX MED GY IP 250 OP 636 PS 637: Performed by: INTERNAL MEDICINE

## 2023-05-09 RX ORDER — PREDNISONE 20 MG/1
20 TABLET ORAL ONCE
Status: COMPLETED | OUTPATIENT
Start: 2023-05-09 | End: 2023-05-09

## 2023-05-09 RX ORDER — LEVALBUTEROL INHALATION SOLUTION 1.25 MG/3ML
1.25 SOLUTION RESPIRATORY (INHALATION) 4 TIMES DAILY
Status: DISCONTINUED | OUTPATIENT
Start: 2023-05-10 | End: 2023-05-11 | Stop reason: HOSPADM

## 2023-05-09 RX ORDER — PREDNISONE 20 MG/1
20 TABLET ORAL ONCE
Qty: 1 TABLET | Refills: 0 | Status: CANCELLED | OUTPATIENT
Start: 2023-05-09 | End: 2023-05-09

## 2023-05-09 RX ORDER — IOPAMIDOL 755 MG/ML
90 INJECTION, SOLUTION INTRAVASCULAR ONCE
Status: COMPLETED | OUTPATIENT
Start: 2023-05-09 | End: 2023-05-09

## 2023-05-09 RX ORDER — PREDNISONE 20 MG/1
60 TABLET ORAL DAILY
Status: DISCONTINUED | OUTPATIENT
Start: 2023-05-10 | End: 2023-05-10

## 2023-05-09 RX ADMIN — PREDNISONE 40 MG: 20 TABLET ORAL at 08:02

## 2023-05-09 RX ADMIN — LEVALBUTEROL HYDROCHLORIDE 1.25 MG: 1.25 SOLUTION RESPIRATORY (INHALATION) at 12:24

## 2023-05-09 RX ADMIN — IPRATROPIUM BROMIDE 0.5 MG: 0.5 SOLUTION RESPIRATORY (INHALATION) at 12:24

## 2023-05-09 RX ADMIN — FLUTICASONE FUROATE 1 PUFF: 100 POWDER RESPIRATORY (INHALATION) at 08:02

## 2023-05-09 RX ADMIN — LEVALBUTEROL HYDROCHLORIDE 1.25 MG: 1.25 SOLUTION RESPIRATORY (INHALATION) at 19:10

## 2023-05-09 RX ADMIN — LEVALBUTEROL HYDROCHLORIDE 1.25 MG: 1.25 SOLUTION RESPIRATORY (INHALATION) at 04:08

## 2023-05-09 RX ADMIN — IPRATROPIUM BROMIDE 0.5 MG: 0.5 SOLUTION RESPIRATORY (INHALATION) at 08:15

## 2023-05-09 RX ADMIN — PREDNISONE 20 MG: 20 TABLET ORAL at 09:51

## 2023-05-09 RX ADMIN — IPRATROPIUM BROMIDE 0.5 MG: 0.5 SOLUTION RESPIRATORY (INHALATION) at 19:10

## 2023-05-09 RX ADMIN — IOPAMIDOL 90 ML: 755 INJECTION, SOLUTION INTRAVENOUS at 18:51

## 2023-05-09 RX ADMIN — LEVALBUTEROL HYDROCHLORIDE 1.25 MG: 1.25 SOLUTION RESPIRATORY (INHALATION) at 00:28

## 2023-05-09 RX ADMIN — IPRATROPIUM BROMIDE 0.5 MG: 0.5 SOLUTION RESPIRATORY (INHALATION) at 04:08

## 2023-05-09 RX ADMIN — IPRATROPIUM BROMIDE 0.5 MG: 0.5 SOLUTION RESPIRATORY (INHALATION) at 00:28

## 2023-05-09 RX ADMIN — LEVALBUTEROL HYDROCHLORIDE 1.25 MG: 1.25 SOLUTION RESPIRATORY (INHALATION) at 08:16

## 2023-05-09 ASSESSMENT — ACTIVITIES OF DAILY LIVING (ADL)
WALKING_OR_CLIMBING_STAIRS_DIFFICULTY: NO
FALL_HISTORY_WITHIN_LAST_SIX_MONTHS: NO
ADLS_ACUITY_SCORE: 31
ADLS_ACUITY_SCORE: 31
ADLS_ACUITY_SCORE: 20
CHANGE_IN_FUNCTIONAL_STATUS_SINCE_ONSET_OF_CURRENT_ILLNESS/INJURY: NO
DOING_ERRANDS_INDEPENDENTLY_DIFFICULTY: NO
VISION_MANAGEMENT: GLASSES
CONCENTRATING,_REMEMBERING_OR_MAKING_DECISIONS_DIFFICULTY: NO
ADLS_ACUITY_SCORE: 31
ADLS_ACUITY_SCORE: 31
DIFFICULTY_COMMUNICATING: NO
ADLS_ACUITY_SCORE: 20
TOILETING_ISSUES: NO
ADLS_ACUITY_SCORE: 31
ADLS_ACUITY_SCORE: 20
WEAR_GLASSES_OR_BLIND: YES
DIFFICULTY_EATING/SWALLOWING: NO
ADLS_ACUITY_SCORE: 20
ADLS_ACUITY_SCORE: 31
HEARING_DIFFICULTY_OR_DEAF: NO
DRESSING/BATHING_DIFFICULTY: NO
ADLS_ACUITY_SCORE: 31
ADLS_ACUITY_SCORE: 31

## 2023-05-09 NOTE — PLAN OF CARE
"Goal Outcome Evaluation:    Patient alert and orientated. On 1L of oxygen. VSS, Hr sinus rhythm/ sinus tachy. Still receiving scheduled nebulizer treatments.     Problem: Plan of Care - These are the overarching goals to be used throughout the patient stay.    Goal: Patient-Specific Goal (Individualized)  Description: You can add care plan individualizations to a care plan. Examples of Individualization might be:  \"Parent requests to be called daily at 9am for status\", \"I have a hard time hearing out of my right ear\", or \"Do not touch me to wake me up as it startles me\".  Outcome: Progressing     Problem: Plan of Care - These are the overarching goals to be used throughout the patient stay.    Goal: Plan of Care Review  Description: The Plan of Care Review/Shift note should be completed every shift.  The Outcome Evaluation is a brief statement about your assessment that the patient is improving, declining, or no change.  This information will be displayed automatically on your shift note.  Outcome: Progressing     "

## 2023-05-09 NOTE — PROGRESS NOTES
RESPIRATORY CARE NOTE:    PT is currently on 1 L NC with an SpO2 of 94%.  Breath sounds diminished and expiratory wheezing. Atrovent and Xopenex nebulizer given x2.  PT tolerated treatments well.  RT will continue to follow.      /70 (BP Location: Right arm)   Pulse 88   Temp 97.7  F (36.5  C) (Oral)   Resp 20   SpO2 92%     Jessy Bledsoe, RT  5/9/2023

## 2023-05-09 NOTE — PLAN OF CARE
Problem: Plan of Care - These are the overarching goals to be used throughout the patient stay.    Goal: Absence of Hospital-Acquired Illness or Injury  Intervention: Identify and Manage Fall Risk  Recent Flowsheet Documentation  Taken 5/9/2023 1600 by Noa Muir RN  Safety Promotion/Fall Prevention: assistive device/personal items within reach     Problem: Asthma Comorbidity  Goal: Maintenance of Asthma Control  Outcome: Progressing     Problem: Gas Exchange Impaired  Goal: Optimal Gas Exchange  Outcome: Progressing     Goal Outcome Evaluation:  Patient currently still on 1 LPM of oxygen. When trying to wean off patient de-stats to 86-88%. Still receiving neb treatments with RT.

## 2023-05-09 NOTE — PLAN OF CARE
"PRIMARY DIAGNOSIS: \"GENERIC\" NURSING  OUTPATIENT/OBSERVATION GOALS TO BE MET BEFORE DISCHARGE:  ADLs back to baseline: Yes    Activity and level of assistance: Ambulating independently.    Pain status: Pain free.    Return to near baseline physical activity: Yes     Discharge Planner Nurse   Safe discharge environment identified: Yes  Barriers to discharge: Yes - On oxygen       Entered by: Gerri Givens RN 05/09/2023 3:00 PM     Please review provider order for any additional goals.   Nurse to notify provider when observation goals have been met and patient is ready for discharge.Goal Outcome Evaluation:                        "

## 2023-05-09 NOTE — PLAN OF CARE
"PRIMARY DIAGNOSIS: \"GENERIC\" NURSING  OUTPATIENT/OBSERVATION GOALS TO BE MET BEFORE DISCHARGE:  ADLs back to baseline: Yes    Activity and level of assistance: Ambulating independently.    Pain status: Pain free.    Return to near baseline physical activity: No     Discharge Planner Nurse   Safe discharge environment identified: Yes  Barriers to discharge: Yes- still on oxygen       Entered by: Noa Muir RN 05/08/2023 11:00 PM     Please review provider order for any additional goals.   Nurse to notify provider when observation goals have been met and patient is ready for discharge.Goal Outcome Evaluation:                        "

## 2023-05-09 NOTE — PROGRESS NOTES
[facility]    Medicine Progress Note - Hospitalist Service    Date of Admission:  5/7/2023    Assessment & Plan   Aylin Rosado is a 37 year old male with medical history of asthma and cigarette smoking, admitted on 5/7/2023 with shortness of breath, wheezing and chest tightness which started after he ingested some energy drinks, and would not respond to his usual albuterol inhaler treatments. ED work-up showed hyperinflation on chest x-ray and sinus tachycardia on EKG.  Troponin was normal.     Acute hypoxic respiratory failure secondary to asthma exacerbation  Patient is currently on 2 L oxygen via nasal cannula.  Patient is still requiring 2 L of oxygen via nasal cannula.-increase  prednisone 600 mg oral daily for 5 days  -continue fluticasone inhaler once daily  -Continue albuterol inhaler  -Noted to have what appeared to be a ST segment elevation on telemetry monitor.  -Twelve-lead EKG done.  No evidence of acute ST segment changes.   patient denies having chest pain.  Troponin and D-dimer are not elevated.  Less likely for ACS  -Continue to taper oxygen as able   -CTA, echocardiogram    Tobacco use disorder:  - Smoking cessation counseling   - Nicotine patch as needed for cravings.     Diet: Regular Diet Adult  Diet    DVT Prophylaxis: Pneumatic Compression Devices  Rebolledo Catheter: Not present  Lines: None     Cardiac Monitoring: None  Code Status:   Full code    Clinically Significant Risk Factors Present on Admission                               Disposition Plan      Expected Discharge Date: 05/10/2023    Discharge Delays: Oxygen Needs - Arrange Home O2              Dayton Farrar MD  Hospitalist Service  [facility]  Securely message with [employer] (more info)  Text page via [employer] Paging/Directory   ______________________________________________________________________    Interval History   No distress noted.  Currently on 2 L oxygen via nasal cannula.   Management plan discussed with the patient and he expressed understanding.    Physical Exam   Vital Signs: Temp: 97.7  F (36.5  C) Temp src: Oral BP: 107/67 Pulse: 99   Resp: 16 SpO2: 94 % O2 Device: Nasal cannula Oxygen Delivery: 1 LPM  Weight: 0 lbs 0 oz    General Appearance: No distress noted  Respiratory: Faint expiratory wheezes scattered  Cardiovascular: S1 and S2 well heard, no murmur or gallop  GI: Soft abdomen, no tenderness, normoactive bowel sounds  Skin: Intact and warm    Medical Decision Making       45 MINUTES SPENT BY ME on the date of service doing chart review, history, exam, documentation & further activities per the note.      Data

## 2023-05-09 NOTE — UTILIZATION REVIEW
Concurrent stay review; Secondary Review Determination     Under the authority of the Utilization Management Committee, the utilization review process indicated a secondary review on Aylin Rosado.  The review outcome is based on review of the medical records, discussions with staff, and applying clinical experience noted on the date of the review.        (x) PROVISIONAL REVIEW: Observation Status Appropriate currently, if unable to wean off oxygen and requiring continued hospitalization please change to inpatient, discussed with Dr. Farrar- Concurrent stay review    RATIONALE FOR DETERMINATION   37-year-old male with asthma and tobacco abuse admitted with acute hypoxic respiratory failure secondary to asthma exacerbation.  Requiring up to 2 L of oxygen and now attempting to wean.  Started on prednisone and started on Pulmicort with albuterol inhaler.  Still requiring oxygen today but weaning for potential discharge.    Patient is clinically improving and there is no clear indication to change patient's status to inpatient. The severity of illness, intensity of service provided, expected LOS and risk for adverse outcome make the care appropriate for observation.    The information on this document is developed by the utilization review team in order for the business office to ensure compliance.  This only denotes the appropriateness of proper admission status and does not reflect the quality of care rendered.         The definitions of Inpatient Status and Observation Status used in making the determination above are those provided in the CMS Coverage Manual, Chapter 1 and Chapter 6, section 70.4.      Sincerely,   Everardo Mcgarry MD  Utilization Review  Physician Advisor  [facility]

## 2023-05-09 NOTE — PROGRESS NOTES
Pt continue to receive Q4 xop/atro neb treatments.  BS exp wheezes before neb, post neb insp/exp wheezes.  Pt on 1 lpm NC, SPO2 93%.  RT will continue to monitor.

## 2023-05-10 ENCOUNTER — APPOINTMENT (OUTPATIENT)
Dept: CARDIOLOGY | Facility: HOSPITAL | Age: 38
End: 2023-05-10
Attending: STUDENT IN AN ORGANIZED HEALTH CARE EDUCATION/TRAINING PROGRAM
Payer: COMMERCIAL

## 2023-05-10 LAB — LVEF ECHO: NORMAL

## 2023-05-10 PROCEDURE — 99232 SBSQ HOSP IP/OBS MODERATE 35: CPT | Performed by: STUDENT IN AN ORGANIZED HEALTH CARE EDUCATION/TRAINING PROGRAM

## 2023-05-10 PROCEDURE — 999N000157 HC STATISTIC RCP TIME EA 10 MIN

## 2023-05-10 PROCEDURE — 250N000011 HC RX IP 250 OP 636: Performed by: STUDENT IN AN ORGANIZED HEALTH CARE EDUCATION/TRAINING PROGRAM

## 2023-05-10 PROCEDURE — 250N000013 HC RX MED GY IP 250 OP 250 PS 637: Performed by: STUDENT IN AN ORGANIZED HEALTH CARE EDUCATION/TRAINING PROGRAM

## 2023-05-10 PROCEDURE — 93306 TTE W/DOPPLER COMPLETE: CPT | Mod: 26 | Performed by: INTERNAL MEDICINE

## 2023-05-10 PROCEDURE — 250N000009 HC RX 250

## 2023-05-10 PROCEDURE — 93306 TTE W/DOPPLER COMPLETE: CPT

## 2023-05-10 PROCEDURE — 94640 AIRWAY INHALATION TREATMENT: CPT | Mod: 76

## 2023-05-10 PROCEDURE — 120N000001 HC R&B MED SURG/OB

## 2023-05-10 PROCEDURE — 94640 AIRWAY INHALATION TREATMENT: CPT

## 2023-05-10 RX ORDER — AZITHROMYCIN 250 MG/1
250 TABLET, FILM COATED ORAL DAILY
Status: DISCONTINUED | OUTPATIENT
Start: 2023-05-11 | End: 2023-05-11 | Stop reason: HOSPADM

## 2023-05-10 RX ORDER — METHYLPREDNISOLONE SODIUM SUCCINATE 40 MG/ML
40 INJECTION, POWDER, LYOPHILIZED, FOR SOLUTION INTRAMUSCULAR; INTRAVENOUS EVERY 8 HOURS
Status: DISCONTINUED | OUTPATIENT
Start: 2023-05-10 | End: 2023-05-11 | Stop reason: HOSPADM

## 2023-05-10 RX ORDER — AZITHROMYCIN 250 MG/1
500 TABLET, FILM COATED ORAL ONCE
Status: COMPLETED | OUTPATIENT
Start: 2023-05-10 | End: 2023-05-10

## 2023-05-10 RX ADMIN — LEVALBUTEROL HYDROCHLORIDE 1.25 MG: 1.25 SOLUTION RESPIRATORY (INHALATION) at 16:28

## 2023-05-10 RX ADMIN — METHYLPREDNISOLONE SODIUM SUCCINATE 40 MG: 40 INJECTION INTRAMUSCULAR; INTRAVENOUS at 16:42

## 2023-05-10 RX ADMIN — AZITHROMYCIN MONOHYDRATE 500 MG: 250 TABLET ORAL at 09:37

## 2023-05-10 RX ADMIN — IPRATROPIUM BROMIDE 0.5 MG: 0.5 SOLUTION RESPIRATORY (INHALATION) at 11:44

## 2023-05-10 RX ADMIN — METHYLPREDNISOLONE SODIUM SUCCINATE 40 MG: 40 INJECTION INTRAMUSCULAR; INTRAVENOUS at 09:38

## 2023-05-10 RX ADMIN — LEVALBUTEROL HYDROCHLORIDE 1.25 MG: 1.25 SOLUTION RESPIRATORY (INHALATION) at 07:47

## 2023-05-10 RX ADMIN — FLUTICASONE FUROATE 1 PUFF: 100 POWDER RESPIRATORY (INHALATION) at 09:37

## 2023-05-10 RX ADMIN — IPRATROPIUM BROMIDE 0.5 MG: 0.5 SOLUTION RESPIRATORY (INHALATION) at 16:28

## 2023-05-10 RX ADMIN — IPRATROPIUM BROMIDE 0.5 MG: 0.5 SOLUTION RESPIRATORY (INHALATION) at 07:46

## 2023-05-10 RX ADMIN — IPRATROPIUM BROMIDE 0.5 MG: 0.5 SOLUTION RESPIRATORY (INHALATION) at 20:25

## 2023-05-10 RX ADMIN — LEVALBUTEROL HYDROCHLORIDE 1.25 MG: 1.25 SOLUTION RESPIRATORY (INHALATION) at 20:25

## 2023-05-10 RX ADMIN — LEVALBUTEROL HYDROCHLORIDE 1.25 MG: 1.25 SOLUTION RESPIRATORY (INHALATION) at 11:44

## 2023-05-10 ASSESSMENT — ACTIVITIES OF DAILY LIVING (ADL)
ADLS_ACUITY_SCORE: 20

## 2023-05-10 NOTE — PROGRESS NOTES
[facility]    Medicine Progress Note - Hospitalist Service    Date of Admission:  5/7/2023    Assessment & Plan   Aylin Rosado is a 37 year old male with medical history of asthma and cigarette smoking, admitted on 5/7/2023 with shortness of breath, wheezing and chest tightness which started after he ingested some energy drinks, and would not respond to his usual albuterol inhaler treatments. ED work-up showed hyperinflation on chest x-ray and sinus tachycardia on EKG.  Troponin was normal.     Acute hypoxic respiratory failure secondary to asthma exacerbation  Patient is currently on 2 L oxygen via nasal cannula.  Patient is still requiring 2 L of oxygen via nasal cannula.  -Discontinue oral steroid and start IV Solu-Medrol.  Start oral azithromycin  -continue fluticasone inhaler once daily  -Continue ipratropium and Xopenex nebulization  -Twelve-lead EKG with no evidence of acute ST segment changes.   patient denies having chest pain.  Troponin and D-dimer are not elevated.  Less likely for ACS  -Continue to taper oxygen as able   -CTA is unremarkable for PE.  Echocardiogram with estimated left ventricular ejection fraction of greater than 65% with no wall motion abnormality.    Tobacco use disorder:  - Smoking cessation counseling   - Nicotine patch as needed for cravings.    Hyperlipidemia  -Cholesterol and LDL are elevated.  Patient has been counseled for lifestyle modifications with dietary changes and physical activity.     Diet: Regular Diet Adult  Diet  Snacks/Supplements Adult: Ensure Enlive; With Meals    DVT Prophylaxis: Pneumatic Compression Devices  Rebolledo Catheter: Not present  Lines: None     Cardiac Monitoring: None  Code Status:   Full code    Clinically Significant Risk Factors                                 Disposition Plan      Expected Discharge Date: 05/11/2023    Discharge Delays: Oxygen Needs - Arrange Home O2              Dayton Farrar MD  Hospitalist  [facility]  Securely message with [employer] (more info)  Text page via [employer] Paging/Directory   ______________________________________________________________________    Interval History   No distress noted.  Currently on 2 L oxygen via nasal cannula.  Management plan discussed with the patient and he expressed understanding.    Physical Exam   Vital Signs: Temp: 98.3  F (36.8  C) Temp src: Oral BP: (!) 135/90 (RN NOTIFIED) Pulse: 93   Resp: 16 SpO2: 93 % O2 Device: Nasal cannula Oxygen Delivery: 2 LPM  Weight: 0 lbs 0 oz    General Appearance: No distress noted  Respiratory: Faint expiratory wheezes scattered  Cardiovascular: S1 and S2 well heard, no murmur or gallop  GI: Soft abdomen, no tenderness, normoactive bowel sounds  Skin: Intact and warm    Medical Decision Making       45 MINUTES SPENT BY ME on the date of service doing chart review, history, exam, documentation & further activities per the note.      Data

## 2023-05-10 NOTE — PROGRESS NOTES
O 2 2.5 lpm/NC, SpO2 93 %. BS clear bilat, diminished bases. Xop/Atro tx goven. Tolerated well. BS after diminished bilat, rhonchi/crackes LLL.     /83 (BP Location: Right arm)   Pulse 89   Temp 98.3  F (36.8  C) (Oral)   Resp 18   SpO2 93%

## 2023-05-10 NOTE — TREATMENT PLAN
RCAT Treatment Plan    Patient Score: 6  Patient Acuity: 4    Clinical Indication for Therapy: Asthma excerebration     Therapy Ordered:  Atrovent/Xopenex QID     Assessment Summary: Pt remains on 2L NC. BS expiratory wheezes before neb with increased aeration post neb. No cough noted.  RT will re-evaluate RCAT in x72 hours.        Troy Cornejo, RRT

## 2023-05-10 NOTE — PROGRESS NOTES
"CLINICAL NUTRITION SERVICES - ASSESSMENT NOTE     Nutrition Prescription    RECOMMENDATIONS FOR MDs/PROVIDERS TO ORDER:  None    Malnutrition Status:    Does not qualify    Recommendations already ordered by Registered Dietitian (RD):  Ensure enlive daily  New weight    Future/Additional Recommendations:  Adjust supplement pending intake, weight, tolerance, acceptance     REASON FOR ASSESSMENT  Aylin Rosado is a/an 37 year old male assessed by the dietitian for Admission Nutrition Risk Screen for positive with unsure of weight loss. No change in intake    Pt presents with asthma exacerbation  Pt is on 2 L O2 NC    NUTRITION HISTORY  Pt reports eating WNL at home.   Does not normally need O2 at home  Pt does not feel he has lost any weight    CURRENT NUTRITION ORDERS  Diet: Regular  Intake/Tolerance: Ate 100% of breakfast yesterday. No other meal intake documented.  Pt ordered 3 meals yesterday at 1210 kcal, 59 g protein    Pt  Did  Not order meals today but did have some food from home. Pt reports his appetite is down here.   Pt is willing to try supplements    LABS  Labs reviewed  Chol 253 (H) 5/8    MEDICATIONS  Medications reviewed  Oral abx, solumedrol    ANTHROPOMETRICS  Height: 160 cm (5' 3\")  Most Recent Weight: No recent measured weights.   130 lb reported in ER in August     IBW: 56.3 kg  BMI: No weight to assess    Dosing Weight: 56.3 kg IBW    ASSESSED NUTRITION NEEDS  Estimated Energy Needs: 3518-5276 kcals/day (30 - 35 kcals/kg )  Justification: Increased needs  Estimated Protein Needs:56-67 grams protein/day (1 - 1.2 grams of pro/kg)  Justification: Hypercatabolism with acute illness  Estimated Fluid Needs: 9347-8923 mL/day (25 - 30 mL/kg)   Justification: Maintenance    PHYSICAL FINDINGS  See malnutrition section below.  GI - No BM yet    MALNUTRITION:  % Weight Loss:  Unable to assess  % Intake: Decreased intake does not meet criteria  Moderate tricep/thoracic fat loss, severe clavical muscle loss, " moderate deltoid and mild calf muscle loss-all are baseline per pt  Fluid Retention:  None noted    Malnutrition Diagnosis: Patient does not meet two of the above criteria necessary for diagnosing malnutrition  In Context of:  Acute illness or injury    NUTRITION DIAGNOSIS  Increased nutrient needs  related to increased energy expenditure as evidenced by increased O2 needs      INTERVENTIONS  Implementation  Ensure enlive daily   Educated pt on increased nutrition needs with increased respiratory needs    Goals  Maintain weight  Meet estimated nutrition needs     Monitoring/Evaluation  Progress toward goals will be monitored and evaluated per protocol.

## 2023-05-10 NOTE — PROGRESS NOTES
Patient continues to receive Q4 Xop/Atrovent neb treatments. BS expiratory wheezes before neb with increased aeration post neb. RT will be monitoring.     Troy Cornejo RT

## 2023-05-10 NOTE — PLAN OF CARE
Problem: Asthma Comorbidity  Goal: Maintenance of Asthma Control  Intervention: Maintain Asthma Symptom Control  Recent Flowsheet Documentation  Taken 5/10/2023 1104 by Shaniqua Alcantar RN  Medication Review/Management: medications reviewed     Problem: Gas Exchange Impaired  Goal: Optimal Gas Exchange  Outcome: Progressing     Problem: Malnutrition  Goal: Improved Nutritional Intake  Outcome: Progressing   Pt alert and oriented. expiratory wheezing in lung bases. SOB with activity but states he feels better today than yesterday. Attempted to wean to 1L O2 but sats 89% and Pt reported feeling increased work to breath ad SOB. Remains on 2L O2 NC. Cough/deep breathe and IS use. Pt independent in room an ambulating. Started PO antibiotics and changed to methylprednisolone. Scheduled inhaler and nebs.

## 2023-05-10 NOTE — PLAN OF CARE
Problem: Asthma Comorbidity  Goal: Maintenance of Asthma Control  Outcome: Progressing  Intervention: Maintain Asthma Symptom Control  Recent Flowsheet Documentation  Taken 5/10/2023 0120 by Kenny Stephenson, RN  Medication Review/Management: medications reviewed     Problem: Gas Exchange Impaired  Goal: Optimal Gas Exchange  Outcome: Progressing  Intervention: Optimize Oxygenation and Ventilation  Recent Flowsheet Documentation  Taken 5/10/2023 0120 by Kenny Stephenson, RN  Head of Bed (HOB) Positioning: HOB at 20-30 degrees   Goal Outcome Evaluation:       O2 sats ranging 93 to 95% on 2.5L O2. O2 sats dropped to 86/87 % when O2 decreased to 2L. Encouraged to CDB.

## 2023-05-10 NOTE — PROGRESS NOTES
O 2 2 lpm/NC, BS diminished bases, clear bilat. Xop/Atrov HHN tx given, tolerated well. BS after unchanged    /69 (BP Location: Right arm)   Pulse 95   Temp 98  F (36.7  C) (Oral)   Resp 16   SpO2 92%

## 2023-05-11 VITALS
DIASTOLIC BLOOD PRESSURE: 69 MMHG | HEIGHT: 63 IN | SYSTOLIC BLOOD PRESSURE: 114 MMHG | BODY MASS INDEX: 19.38 KG/M2 | OXYGEN SATURATION: 91 % | WEIGHT: 109.35 LBS | TEMPERATURE: 98 F | RESPIRATION RATE: 20 BRPM | HEART RATE: 115 BPM

## 2023-05-11 LAB
ANION GAP SERPL CALCULATED.3IONS-SCNC: 11 MMOL/L (ref 7–15)
BUN SERPL-MCNC: 25.7 MG/DL (ref 6–20)
CALCIUM SERPL-MCNC: 9.6 MG/DL (ref 8.6–10)
CHLORIDE SERPL-SCNC: 103 MMOL/L (ref 98–107)
CREAT SERPL-MCNC: 0.92 MG/DL (ref 0.67–1.17)
DEPRECATED HCO3 PLAS-SCNC: 25 MMOL/L (ref 22–29)
ERYTHROCYTE [DISTWIDTH] IN BLOOD BY AUTOMATED COUNT: 13.5 % (ref 10–15)
GFR SERPL CREATININE-BSD FRML MDRD: >90 ML/MIN/1.73M2
GLUCOSE SERPL-MCNC: 126 MG/DL (ref 70–99)
HCT VFR BLD AUTO: 43.6 % (ref 40–53)
HGB BLD-MCNC: 14.2 G/DL (ref 13.3–17.7)
MCH RBC QN AUTO: 29.5 PG (ref 26.5–33)
MCHC RBC AUTO-ENTMCNC: 32.6 G/DL (ref 31.5–36.5)
MCV RBC AUTO: 91 FL (ref 78–100)
PLATELET # BLD AUTO: 178 10E3/UL (ref 150–450)
POTASSIUM SERPL-SCNC: 4.5 MMOL/L (ref 3.4–5.3)
RBC # BLD AUTO: 4.81 10E6/UL (ref 4.4–5.9)
SODIUM SERPL-SCNC: 139 MMOL/L (ref 136–145)
WBC # BLD AUTO: 12.5 10E3/UL (ref 4–11)

## 2023-05-11 PROCEDURE — 250N000011 HC RX IP 250 OP 636: Performed by: STUDENT IN AN ORGANIZED HEALTH CARE EDUCATION/TRAINING PROGRAM

## 2023-05-11 PROCEDURE — 250N000013 HC RX MED GY IP 250 OP 250 PS 637: Performed by: STUDENT IN AN ORGANIZED HEALTH CARE EDUCATION/TRAINING PROGRAM

## 2023-05-11 PROCEDURE — 999N000157 HC STATISTIC RCP TIME EA 10 MIN

## 2023-05-11 PROCEDURE — 99239 HOSP IP/OBS DSCHRG MGMT >30: CPT | Performed by: STUDENT IN AN ORGANIZED HEALTH CARE EDUCATION/TRAINING PROGRAM

## 2023-05-11 PROCEDURE — 94640 AIRWAY INHALATION TREATMENT: CPT

## 2023-05-11 PROCEDURE — 85027 COMPLETE CBC AUTOMATED: CPT | Performed by: STUDENT IN AN ORGANIZED HEALTH CARE EDUCATION/TRAINING PROGRAM

## 2023-05-11 PROCEDURE — 250N000009 HC RX 250

## 2023-05-11 PROCEDURE — 36415 COLL VENOUS BLD VENIPUNCTURE: CPT | Performed by: STUDENT IN AN ORGANIZED HEALTH CARE EDUCATION/TRAINING PROGRAM

## 2023-05-11 PROCEDURE — 94640 AIRWAY INHALATION TREATMENT: CPT | Mod: 76

## 2023-05-11 PROCEDURE — 82310 ASSAY OF CALCIUM: CPT | Performed by: STUDENT IN AN ORGANIZED HEALTH CARE EDUCATION/TRAINING PROGRAM

## 2023-05-11 RX ORDER — AZITHROMYCIN 250 MG/1
250 TABLET, FILM COATED ORAL DAILY
Qty: 4 TABLET | Refills: 0 | Status: SHIPPED | OUTPATIENT
Start: 2023-05-12

## 2023-05-11 RX ORDER — METHYLPREDNISOLONE 4 MG
TABLET, DOSE PACK ORAL
Qty: 21 TABLET | Refills: 0 | Status: SHIPPED | OUTPATIENT
Start: 2023-05-11

## 2023-05-11 RX ADMIN — FLUTICASONE FUROATE 1 PUFF: 100 POWDER RESPIRATORY (INHALATION) at 09:37

## 2023-05-11 RX ADMIN — LEVALBUTEROL HYDROCHLORIDE 1.25 MG: 1.25 SOLUTION RESPIRATORY (INHALATION) at 07:51

## 2023-05-11 RX ADMIN — IPRATROPIUM BROMIDE 0.5 MG: 0.5 SOLUTION RESPIRATORY (INHALATION) at 07:52

## 2023-05-11 RX ADMIN — METHYLPREDNISOLONE SODIUM SUCCINATE 40 MG: 40 INJECTION INTRAMUSCULAR; INTRAVENOUS at 09:38

## 2023-05-11 RX ADMIN — AZITHROMYCIN MONOHYDRATE 250 MG: 250 TABLET ORAL at 09:38

## 2023-05-11 RX ADMIN — IPRATROPIUM BROMIDE 0.5 MG: 0.5 SOLUTION RESPIRATORY (INHALATION) at 12:54

## 2023-05-11 RX ADMIN — LEVALBUTEROL HYDROCHLORIDE 1.25 MG: 1.25 SOLUTION RESPIRATORY (INHALATION) at 12:54

## 2023-05-11 RX ADMIN — METHYLPREDNISOLONE SODIUM SUCCINATE 40 MG: 40 INJECTION INTRAMUSCULAR; INTRAVENOUS at 00:36

## 2023-05-11 ASSESSMENT — ACTIVITIES OF DAILY LIVING (ADL)
ADLS_ACUITY_SCORE: 20

## 2023-05-11 NOTE — PROGRESS NOTES
Patient has been assessed for Home Oxygen needs.     Pulse oximetry (SpO2) and Oxygen flow readings:    SpO2 = 87% on room air at rest while awake.    SpO2 improved to 90% on 1 liters/minute at rest.    SpO2 = 85% on room air during activity/with exercise.    *SpO2 improved to 90% on 1 liters/minute during activity/with exercise.

## 2023-05-11 NOTE — PLAN OF CARE
Pt weaned to 1L O2 NC, sats 90-93%. LS clear, diminished , no wheezing noted. Reciving IV solumedrol. Intermittent SOB with activity but states he feels better than earlier today. IS use. Encouraging intake. Family bring food from home

## 2023-05-11 NOTE — DISCHARGE SUMMARY
[facility]  Hospitalist Discharge Summary      Date of Admission:  5/7/2023  Date of Discharge:  5/11/2023  2:02 PM  Discharging Provider: Dayton Farrar MD  Discharge Service: Hospitalist Service    Discharge Diagnoses   Acute hypoxic respiratory failure secondary to asthma exacerbation  Tobacco use disorder  Hyperlipidemia    Follow-ups Needed After Discharge   Follow-up Appointments     Follow-up and recommended labs and tests       Follow up with primary care provider, WILLY SHAY, within 7 days for   hospital follow- up.  No follow up labs or test are needed.         Follow-up and recommended labs and tests       Follow up with primary care provider, WILLY SHAY, within 7 days for   hospital follow- up.  No follow up labs or test are needed.             Unresulted Labs Ordered in the Past 30 Days of this Admission     No orders found from 4/7/2023 to 5/8/2023.      These results will be followed up by     Discharge Disposition   Discharged to home  Condition at discharge: Good      Hospital Course   Aylin Rosado is a 37 year old male with medical history of asthma and cigarette smoking, admitted on 5/7/2023 with shortness of breath, wheezing and chest tightness which started after he ingested some energy drinks, and would not respond to his usual albuterol inhaler treatments. ED work-up showed hyperinflation on chest x-ray and sinus tachycardia on EKG.  Troponin was normal.   Acute hypoxic respiratory failure secondary to asthma exacerbation  Patient is currently on 2 L oxygen via nasal cannula.  -CTA is unremarkable for PE.  Echocardiogram with estimated left ventricular ejection fraction of greater than 65% with no wall motion abnormality.  -continue fluticasone inhaler once daily  -Started on azithromycin and steroid  -Continue ipratropium and Xopenex nebulization  -Twelve-lead EKG with no evidence of acute ST segment changes.   patient denies having chest pain.  Troponin and  D-dimer are not elevated.  Less likely for ACS  -Continue to taper oxygen as able .  Unable to turn off oxygen therefore home oxygen assessment was done.  Patient was found to be eligible for home oxygen.  Tobacco use disorder:  - Smoking cessation counseling   - Nicotine patch as needed for cravings.  Hyperlipidemia  -Cholesterol and LDL are elevated.  Patient has been counseled for lifestyle modifications with dietary changes and physical activity.    Patient's respiratory status improved but unable to be weaned off oxygen.  Home oxygen assessment was done and he was found to be eligible.  Patient is discharged with home oxygen.  Medication sent to his pharmacy    Consultations This Hospital Stay   None    Code Status   Full Code    Time Spent on this Encounter   I, Dayton Farrar MD, personally saw the patient today and spent greater than 30 minutes discharging this patient.       Dayton Farrar MD  [address] [address]  Phone: [phone]  Fax: [phone]  ______________________________________________________________________    Physical Exam   Vital Signs: Temp: 98  F (36.7  C) Temp src: Oral BP: 114/69 Pulse: 90   Resp: 20 SpO2: (!) 85 % (walking) O2 Device: None (Room air) Oxygen Delivery: 1 LPM  Weight: 109 lbs 5.57 oz  General Appearance:  No distress noted  Respiratory: Faint expiratory wheezes scattered  Cardiovascular: S1 and S2 well heard, no murmur or gallop  GI: Soft abdomen, no tenderness, normoactive bowel sounds  Skin: Intact and warm       Primary Care Physician   WILLY SHAY    Discharge Orders      Reason for your hospital stay    Asthma exacerbation     Follow-up and recommended labs and tests     Follow up with primary care provider, WILLY SHAY, within 7 days for hospital follow- up.  No follow up labs or test are needed.     Activity    Your activity upon discharge: activity as tolerated     Reason for your  hospital stay    Asthma exacerbation     Follow-up and recommended labs and tests     Follow up with primary care provider, WILLY SHAY, within 7 days for hospital follow- up.  No follow up labs or test are needed.     Activity    Your activity upon discharge: activity as tolerated     Oxygen Adult/Peds    Oxygen Documentation  I certify that this patient, Aylin Rosado has been under my care (or a nurse practitioner or physican's assistant working with me). This is the face-to-face encounter for oxygen medical necessity.      At the time of this encounter supplemental oxygen is reasonable and necessary and is expected to improve the patient's condition in a home setting.       Patient has continued oxygen desaturation due to Moderate Persistent Asthma J45.40.    If portability is ordered, is the patient mobile within the home? yes     Diet    Follow this diet upon discharge: Orders Placed This Encounter      Regular Diet Adult     Diet    Follow this diet upon discharge: Orders Placed This Encounter      Snacks/Supplements Adult: Ensure Enlive; With Meals      Regular Diet Adult      Diet       Significant Results and Procedures   Most Recent 3 CBC's:Recent Labs   Lab Test 05/11/23  0853 05/08/23 0737 05/07/23 2127   WBC 12.5* 9.4 13.1*   HGB 14.2 16.8 17.6   MCV 91 90 90    218 194     Most Recent 3 BMP's:Recent Labs   Lab Test 05/11/23  0853 05/07/23 2127 08/14/22  1839    135* 140   POTASSIUM 4.5 4.4 4.8   CHLORIDE 103 98 102   CO2 25 22 30   BUN 25.7* 23.2* 22   CR 0.92 0.84 1.14   ANIONGAP 11 15 8   MADISON 9.6 9.7 9.6   * 104* 95     Most Recent 2 LFT's:No lab results found.  Most Recent 3 INR's:No lab results found.  Most Recent INR's and Anticoagulation Dosing History:  Anticoagulation Dose History          View : No data to display.                    Most Recent 3 Creatinines:Recent Labs   Lab Test 05/11/23  0853 05/07/23 2127 08/14/22  1839   CR 0.92 0.84 1.14     Most Recent 3  Hemoglobins:Recent Labs   Lab Test 05/11/23  0853 05/08/23  0737 05/07/23  2127   HGB 14.2 16.8 17.6     Most Recent 3 Troponin's:No lab results found.  Most Recent 3 BNP's:No lab results found.    Discharge Medications   Current Discharge Medication List      START taking these medications    Details   azithromycin (ZITHROMAX) 250 MG tablet Take 1 tablet (250 mg) by mouth daily  Qty: 4 tablet, Refills: 0    Associated Diagnoses: Asthma with acute exacerbation, unspecified asthma severity, unspecified whether persistent      fluticasone (ARNUITY ELLIPTA) 100 MCG/ACT inhaler Inhale 1 puff into the lungs daily  Qty: 30 each, Refills: 0    Associated Diagnoses: Asthma with acute exacerbation, unspecified asthma severity, unspecified whether persistent      methylPREDNISolone (MEDROL DOSEPAK) 4 MG tablet therapy pack Follow Package Directions  Qty: 21 tablet, Refills: 0    Associated Diagnoses: Asthma with acute exacerbation, unspecified asthma severity, unspecified whether persistent         CONTINUE these medications which have NOT CHANGED    Details   albuterol (PROAIR HFA;PROVENTIL HFA;VENTOLIN HFA) 90 mcg/actuation inhaler [ALBUTEROL (PROAIR HFA;PROVENTIL HFA;VENTOLIN HFA) 90 MCG/ACTUATION INHALER] Inhale 1-2 puffs every 6 (six) hours as needed for wheezing or shortness of breath.  Qty: 1 Inhaler, Refills: 0    Comments: May substitute the equivalent medication per insurance preference.  Associated Diagnoses: Wheezing      inhalat.spacing dev,large mask Spcr [INHALAT.SPACING DEV,LARGE MASK SPCR] Use 1 each As Directed every 4 (four) hours as needed.  Qty: 1 each, Refills: 0    Comments: Standard inhaler spacer  Associated Diagnoses: Wheezing           Allergies   No Known Allergies

## 2023-05-11 NOTE — PROGRESS NOTES
RESPIRATORY CARE NOTE     Patient Name: Aylin Rosado  Today's Date: 5/11/2023       Pt remains on 1LNC and denies any shortness of breath at this time. BS: clear to auscultations and decreased @ bases. Pt did receive his scheduled neb txs as ordered, no undesired effects noted. Pt does have good non-productive cough. RT will continue to follow.     Lucrecia Clark RRT

## 2023-05-11 NOTE — PROGRESS NOTES
Patient seen on 1L nc spo2 94% BS pre neb clear dim/ post neb exp wheezes. Instructed Patient on MDI spacer use. Patient demonstrated good technique and understanding. RT will continue to follow.

## 2023-05-11 NOTE — PLAN OF CARE
Pt qualified for home O2. Sating 85% RA. Needing 1-2 L O2 NC with activity.independent  and frequently walking in room. LS clear with intermittent SOB. Cough and deep breath and IS use. PO antibiotics and IV prednisone given per order. PT to discharge home with O2. O2 delivered and Pt instructed on how to work O2. Went over all discharge paper work, O2 safety, meds, follow up care. Went over O2 safety and number to call with questions. Pt aware he can not smoke while using or near O2. Pt stated he already talked to his doctor to get nicotine patches. Pt will make own follow up appointment. Answered all of Pts questions. All belongings packed and sent with Pt. Pt placed on 2L O2 NC home O2 for ride home. Pts family transporting.

## 2023-05-11 NOTE — PROGRESS NOTES
I certify that this patient, Aylin Rosado has been under my care (or a nurse practitioner or physican's assistant working with me). This is the face-to-face encounter for oxygen medical necessity.      At the time of this encounter supplemental oxygen is reasonable and necessary and is expected to improve the patient's condition in a home setting.       Patient has continued oxygen desaturation due to Moderate Persistent Asthma J45.40.    If portability is ordered, is the patient mobile within the home? yes

## 2023-05-11 NOTE — PLAN OF CARE
"  Problem: Asthma Comorbidity  Goal: Maintenance of Asthma Control  Outcome: Progressing  Intervention: Maintain Asthma Symptom Control  Recent Flowsheet Documentation  Taken 5/11/2023 0212 by Kayleen Newton, RN  Medication Review/Management: medications reviewed     Problem: Plan of Care - These are the overarching goals to be used throughout the patient stay.    Goal: Patient-Specific Goal (Individualized)  Description: You can add care plan individualizations to a care plan. Examples of Individualization might be:  \"Parent requests to be called daily at 9am for status\", \"I have a hard time hearing out of my right ear\", or \"Do not touch me to wake me up as it startles me\".  Outcome: Progressing     Problem: Malnutrition  Goal: Improved Nutritional Intake  Outcome: Progressing     Problem: Gas Exchange Impaired  Goal: Optimal Gas Exchange  Outcome: Progressing  Intervention: Optimize Oxygenation and Ventilation  Recent Flowsheet Documentation  Taken 5/11/2023 0212 by Kayleen Newton, RN  Head of Bed (HOB) Positioning: HOB at 20-30 degrees   Goal Outcome Evaluation:       Pt is A/O X4, is on 1 L with NC sating 93/94%, denied pain, is independent in the room, on continuous pulse ox, lung sounds are clear, can be SOB with activity, using IS at bedside.                  "